# Patient Record
Sex: MALE | Race: WHITE | NOT HISPANIC OR LATINO | Employment: OTHER | URBAN - METROPOLITAN AREA
[De-identification: names, ages, dates, MRNs, and addresses within clinical notes are randomized per-mention and may not be internally consistent; named-entity substitution may affect disease eponyms.]

---

## 2018-06-07 ENCOUNTER — ANESTHESIA EVENT (OUTPATIENT)
Dept: OPERATING ROOM | Facility: HOSPITAL | Age: 73
Setting detail: HOSPITAL OUTPATIENT SURGERY
DRG: 708 | End: 2018-06-07
Payer: COMMERCIAL

## 2018-06-08 ENCOUNTER — HOSPITAL ENCOUNTER (INPATIENT)
Facility: HOSPITAL | Age: 73
LOS: 1 days | Discharge: HOME | DRG: 708 | End: 2018-06-09
Attending: UROLOGY | Admitting: UROLOGY
Payer: COMMERCIAL

## 2018-06-08 ENCOUNTER — ANESTHESIA (OUTPATIENT)
Dept: OPERATING ROOM | Facility: HOSPITAL | Age: 73
Setting detail: HOSPITAL OUTPATIENT SURGERY
DRG: 708 | End: 2018-06-08
Payer: COMMERCIAL

## 2018-06-08 DIAGNOSIS — C61 PROSTATE CA (CMS/HCC): ICD-10-CM

## 2018-06-08 LAB
ABO + RH BLD: NORMAL
ABO + RH BLD: NORMAL
BLD GP AB SCN SERPL QL: NEGATIVE
D AG BLD QL: POSITIVE
D AG BLD QL: POSITIVE
LABORATORY COMMENT REPORT: NORMAL
LABORATORY COMMENT REPORT: NORMAL

## 2018-06-08 PROCEDURE — 63600000 HC DRUGS/DETAIL CODE: Performed by: NURSE ANESTHETIST, CERTIFIED REGISTERED

## 2018-06-08 PROCEDURE — 25800000 HC PHARMACY IV SOLUTIONS: Performed by: UROLOGY

## 2018-06-08 PROCEDURE — 63600000 HC DRUGS/DETAIL CODE: Performed by: UROLOGY

## 2018-06-08 PROCEDURE — 27200000 HC STERILE SUPPLY: Performed by: UROLOGY

## 2018-06-08 PROCEDURE — 71000011 HC PACU PHASE 1 EA ADDL MIN: Performed by: UROLOGY

## 2018-06-08 PROCEDURE — 36000016 HC OR LEVEL 6 EA ADDL MIN: Performed by: UROLOGY

## 2018-06-08 PROCEDURE — 86850 RBC ANTIBODY SCREEN: CPT

## 2018-06-08 PROCEDURE — 20600000 HC ROOM AND CARE INTERMEDIATE/TELEMETRY

## 2018-06-08 PROCEDURE — 71000001 HC PACU PHASE 1 INITIAL 30MIN: Performed by: UROLOGY

## 2018-06-08 PROCEDURE — 0VT34ZZ RESECTION OF BILATERAL SEMINAL VESICLES, PERCUTANEOUS ENDOSCOPIC APPROACH: ICD-10-PCS | Performed by: UROLOGY

## 2018-06-08 PROCEDURE — 63700000 HC SELF-ADMINISTRABLE DRUG: Performed by: UROLOGY

## 2018-06-08 PROCEDURE — 36415 COLL VENOUS BLD VENIPUNCTURE: CPT | Performed by: UROLOGY

## 2018-06-08 PROCEDURE — 25800000 HC PHARMACY IV SOLUTIONS: Performed by: NURSE ANESTHETIST, CERTIFIED REGISTERED

## 2018-06-08 PROCEDURE — 25000000 HC PHARMACY GENERAL: Performed by: NURSE ANESTHETIST, CERTIFIED REGISTERED

## 2018-06-08 PROCEDURE — 25000000 HC PHARMACY GENERAL: Performed by: UROLOGY

## 2018-06-08 PROCEDURE — 63600000 HC DRUGS/DETAIL CODE: Performed by: ANESTHESIOLOGY

## 2018-06-08 PROCEDURE — 36000006 HC OR LEVEL 6 INITIAL 30MIN: Performed by: UROLOGY

## 2018-06-08 PROCEDURE — 0VTQ4ZZ RESECTION OF BILATERAL VAS DEFERENS, PERCUTANEOUS ENDOSCOPIC APPROACH: ICD-10-PCS | Performed by: UROLOGY

## 2018-06-08 PROCEDURE — 88304 TISSUE EXAM BY PATHOLOGIST: CPT | Performed by: UROLOGY

## 2018-06-08 PROCEDURE — 0VT04ZZ RESECTION OF PROSTATE, PERCUTANEOUS ENDOSCOPIC APPROACH: ICD-10-PCS | Performed by: UROLOGY

## 2018-06-08 PROCEDURE — 07TC4ZZ RESECTION OF PELVIS LYMPHATIC, PERCUTANEOUS ENDOSCOPIC APPROACH: ICD-10-PCS | Performed by: UROLOGY

## 2018-06-08 PROCEDURE — 37000001 HC ANESTHESIA GENERAL: Performed by: UROLOGY

## 2018-06-08 RX ORDER — IBUPROFEN 200 MG
16-32 TABLET ORAL AS NEEDED
Status: DISCONTINUED | OUTPATIENT
Start: 2018-06-08 | End: 2018-06-08 | Stop reason: HOSPADM

## 2018-06-08 RX ORDER — DEXTROSE 40 %
15-30 GEL (GRAM) ORAL AS NEEDED
Status: DISCONTINUED | OUTPATIENT
Start: 2018-06-08 | End: 2018-06-08 | Stop reason: HOSPADM

## 2018-06-08 RX ORDER — PROMETHAZINE HYDROCHLORIDE 25 MG/ML
6.25 INJECTION, SOLUTION INTRAMUSCULAR; INTRAVENOUS ONCE
Status: COMPLETED | OUTPATIENT
Start: 2018-06-08 | End: 2018-06-08

## 2018-06-08 RX ORDER — EPHEDRINE SULFATE 50 MG/ML
INJECTION, SOLUTION INTRAVENOUS AS NEEDED
Status: DISCONTINUED | OUTPATIENT
Start: 2018-06-08 | End: 2018-06-08 | Stop reason: SURG

## 2018-06-08 RX ORDER — SODIUM CHLORIDE, SODIUM GLUCONATE, SODIUM ACETATE, POTASSIUM CHLORIDE AND MAGNESIUM CHLORIDE 30; 37; 368; 526; 502 MG/100ML; MG/100ML; MG/100ML; MG/100ML; MG/100ML
INJECTION, SOLUTION INTRAVENOUS CONTINUOUS PRN
Status: DISCONTINUED | OUTPATIENT
Start: 2018-06-08 | End: 2018-06-08 | Stop reason: SURG

## 2018-06-08 RX ORDER — DEXAMETHASONE SODIUM PHOSPHATE 4 MG/ML
INJECTION, SOLUTION INTRA-ARTICULAR; INTRALESIONAL; INTRAMUSCULAR; INTRAVENOUS; SOFT TISSUE AS NEEDED
Status: DISCONTINUED | OUTPATIENT
Start: 2018-06-08 | End: 2018-06-08 | Stop reason: SURG

## 2018-06-08 RX ORDER — DEXTROSE 40 %
15-30 GEL (GRAM) ORAL AS NEEDED
Status: DISCONTINUED | OUTPATIENT
Start: 2018-06-08 | End: 2018-06-09 | Stop reason: HOSPADM

## 2018-06-08 RX ORDER — KETOROLAC TROMETHAMINE 30 MG/ML
INJECTION, SOLUTION INTRAMUSCULAR; INTRAVENOUS AS NEEDED
Status: DISCONTINUED | OUTPATIENT
Start: 2018-06-08 | End: 2018-06-08 | Stop reason: SURG

## 2018-06-08 RX ORDER — OXYCODONE AND ACETAMINOPHEN 5; 325 MG/1; MG/1
1-2 TABLET ORAL EVERY 4 HOURS PRN
Status: DISCONTINUED | OUTPATIENT
Start: 2018-06-08 | End: 2018-06-09 | Stop reason: HOSPADM

## 2018-06-08 RX ORDER — SODIUM CHLORIDE 9 MG/ML
INJECTION, SOLUTION INTRAVENOUS CONTINUOUS
Status: DISCONTINUED | OUTPATIENT
Start: 2018-06-08 | End: 2018-06-09 | Stop reason: HOSPADM

## 2018-06-08 RX ORDER — POLYETHYLENE GLYCOL 3350 17 G/17G
17 POWDER, FOR SOLUTION ORAL DAILY
Status: DISCONTINUED | OUTPATIENT
Start: 2018-06-08 | End: 2018-06-09 | Stop reason: HOSPADM

## 2018-06-08 RX ORDER — POTASSIUM CHLORIDE 750 MG/1
10 TABLET, FILM COATED, EXTENDED RELEASE ORAL DAILY
Status: DISCONTINUED | OUTPATIENT
Start: 2018-06-08 | End: 2018-06-09 | Stop reason: HOSPADM

## 2018-06-08 RX ORDER — METOCLOPRAMIDE HYDROCHLORIDE 5 MG/ML
10 INJECTION INTRAMUSCULAR; INTRAVENOUS EVERY 6 HOURS PRN
Status: DISCONTINUED | OUTPATIENT
Start: 2018-06-08 | End: 2018-06-09 | Stop reason: HOSPADM

## 2018-06-08 RX ORDER — MIDAZOLAM HYDROCHLORIDE 2 MG/2ML
INJECTION, SOLUTION INTRAMUSCULAR; INTRAVENOUS AS NEEDED
Status: DISCONTINUED | OUTPATIENT
Start: 2018-06-08 | End: 2018-06-08 | Stop reason: SURG

## 2018-06-08 RX ORDER — KETOROLAC TROMETHAMINE 15 MG/ML
15 INJECTION, SOLUTION INTRAMUSCULAR; INTRAVENOUS EVERY 6 HOURS
Status: DISCONTINUED | OUTPATIENT
Start: 2018-06-08 | End: 2018-06-09 | Stop reason: HOSPADM

## 2018-06-08 RX ORDER — FENTANYL CITRATE 50 UG/ML
INJECTION, SOLUTION INTRAMUSCULAR; INTRAVENOUS AS NEEDED
Status: DISCONTINUED | OUTPATIENT
Start: 2018-06-08 | End: 2018-06-08 | Stop reason: SURG

## 2018-06-08 RX ORDER — LIDOCAINE HYDROCHLORIDE 10 MG/ML
INJECTION, SOLUTION INFILTRATION; PERINEURAL AS NEEDED
Status: DISCONTINUED | OUTPATIENT
Start: 2018-06-08 | End: 2018-06-08 | Stop reason: SURG

## 2018-06-08 RX ORDER — ONDANSETRON HYDROCHLORIDE 2 MG/ML
4 INJECTION, SOLUTION INTRAVENOUS
Status: DISCONTINUED | OUTPATIENT
Start: 2018-06-08 | End: 2018-06-08 | Stop reason: HOSPADM

## 2018-06-08 RX ORDER — PROPOFOL 10 MG/ML
INJECTION, EMULSION INTRAVENOUS AS NEEDED
Status: DISCONTINUED | OUTPATIENT
Start: 2018-06-08 | End: 2018-06-08 | Stop reason: SURG

## 2018-06-08 RX ORDER — ALUMINUM HYDROXIDE, MAGNESIUM HYDROXIDE, AND SIMETHICONE 1200; 120; 1200 MG/30ML; MG/30ML; MG/30ML
30 SUSPENSION ORAL EVERY 4 HOURS PRN
Status: DISCONTINUED | OUTPATIENT
Start: 2018-06-08 | End: 2018-06-09 | Stop reason: HOSPADM

## 2018-06-08 RX ORDER — FENTANYL CITRATE 50 UG/ML
50 INJECTION, SOLUTION INTRAMUSCULAR; INTRAVENOUS
Status: DISCONTINUED | OUTPATIENT
Start: 2018-06-08 | End: 2018-06-08

## 2018-06-08 RX ORDER — EPHEDRINE SULFATE 50 MG/ML
INJECTION, SOLUTION INTRAVENOUS AS NEEDED
Status: DISCONTINUED | OUTPATIENT
Start: 2018-06-08 | End: 2018-06-08

## 2018-06-08 RX ORDER — HYDROMORPHONE HYDROCHLORIDE 1 MG/ML
0.5 INJECTION, SOLUTION INTRAMUSCULAR; INTRAVENOUS; SUBCUTANEOUS
Status: DISCONTINUED | OUTPATIENT
Start: 2018-06-08 | End: 2018-06-08

## 2018-06-08 RX ORDER — POLYETHYLENE GLYCOL 3350 17 G/17G
POWDER, FOR SOLUTION ORAL
Status: DISPENSED
Start: 2018-06-08 | End: 2018-06-09

## 2018-06-08 RX ORDER — BUPIVACAINE HYDROCHLORIDE 5 MG/ML
INJECTION, SOLUTION EPIDURAL; INTRACAUDAL AS NEEDED
Status: DISCONTINUED | OUTPATIENT
Start: 2018-06-08 | End: 2018-06-08 | Stop reason: HOSPADM

## 2018-06-08 RX ORDER — PROMETHAZINE HYDROCHLORIDE 25 MG/ML
6.25 INJECTION, SOLUTION INTRAMUSCULAR; INTRAVENOUS AS NEEDED
Status: DISCONTINUED | OUTPATIENT
Start: 2018-06-08 | End: 2018-06-08 | Stop reason: HOSPADM

## 2018-06-08 RX ORDER — DEXTROSE 50 % IN WATER (D50W) INTRAVENOUS SYRINGE
25 AS NEEDED
Status: DISCONTINUED | OUTPATIENT
Start: 2018-06-08 | End: 2018-06-08 | Stop reason: HOSPADM

## 2018-06-08 RX ORDER — IBUPROFEN 200 MG
16-32 TABLET ORAL AS NEEDED
Status: DISCONTINUED | OUTPATIENT
Start: 2018-06-08 | End: 2018-06-09 | Stop reason: HOSPADM

## 2018-06-08 RX ORDER — ROCURONIUM BROMIDE 10 MG/ML
INJECTION, SOLUTION INTRAVENOUS AS NEEDED
Status: DISCONTINUED | OUTPATIENT
Start: 2018-06-08 | End: 2018-06-08 | Stop reason: SURG

## 2018-06-08 RX ORDER — EZETIMIBE 10 MG/1
10 TABLET ORAL
Status: DISCONTINUED | OUTPATIENT
Start: 2018-06-09 | End: 2018-06-09 | Stop reason: HOSPADM

## 2018-06-08 RX ORDER — HYDROMORPHONE HYDROCHLORIDE 1 MG/ML
INJECTION, SOLUTION INTRAMUSCULAR; INTRAVENOUS; SUBCUTANEOUS AS NEEDED
Status: DISCONTINUED | OUTPATIENT
Start: 2018-06-08 | End: 2018-06-08 | Stop reason: SURG

## 2018-06-08 RX ORDER — ONDANSETRON HYDROCHLORIDE 2 MG/ML
INJECTION, SOLUTION INTRAVENOUS AS NEEDED
Status: DISCONTINUED | OUTPATIENT
Start: 2018-06-08 | End: 2018-06-08 | Stop reason: SURG

## 2018-06-08 RX ORDER — LIDOCAINE HYDROCHLORIDE 20 MG/ML
INJECTION, SOLUTION INFILTRATION; PERINEURAL AS NEEDED
Status: DISCONTINUED | OUTPATIENT
Start: 2018-06-08 | End: 2018-06-08 | Stop reason: HOSPADM

## 2018-06-08 RX ORDER — DEXTROSE 50 % IN WATER (D50W) INTRAVENOUS SYRINGE
25 AS NEEDED
Status: DISCONTINUED | OUTPATIENT
Start: 2018-06-08 | End: 2018-06-09 | Stop reason: HOSPADM

## 2018-06-08 RX ORDER — SODIUM CHLORIDE 9 MG/ML
20 INJECTION, SOLUTION INTRAVENOUS CONTINUOUS
Status: ACTIVE | OUTPATIENT
Start: 2018-06-08 | End: 2018-06-09

## 2018-06-08 RX ORDER — FUROSEMIDE 20 MG/1
20 TABLET ORAL DAILY
Status: DISCONTINUED | OUTPATIENT
Start: 2018-06-08 | End: 2018-06-09 | Stop reason: HOSPADM

## 2018-06-08 RX ORDER — HYDROMORPHONE HYDROCHLORIDE 1 MG/ML
.25-.5 INJECTION, SOLUTION INTRAMUSCULAR; INTRAVENOUS; SUBCUTANEOUS
Status: DISCONTINUED | OUTPATIENT
Start: 2018-06-08 | End: 2018-06-09 | Stop reason: HOSPADM

## 2018-06-08 RX ORDER — METOPROLOL TARTRATE 25 MG/1
25 TABLET, FILM COATED ORAL 2 TIMES DAILY
Status: DISCONTINUED | OUTPATIENT
Start: 2018-06-08 | End: 2018-06-09 | Stop reason: HOSPADM

## 2018-06-08 RX ORDER — METOCLOPRAMIDE 5 MG/1
10 TABLET ORAL EVERY 6 HOURS PRN
Status: DISCONTINUED | OUTPATIENT
Start: 2018-06-08 | End: 2018-06-09 | Stop reason: HOSPADM

## 2018-06-08 RX ORDER — ROSUVASTATIN CALCIUM 5 MG/1
5 TABLET, COATED ORAL
Status: DISCONTINUED | OUTPATIENT
Start: 2018-06-09 | End: 2018-06-09 | Stop reason: HOSPADM

## 2018-06-08 RX ADMIN — SODIUM CHLORIDE: 9 INJECTION, SOLUTION INTRAVENOUS at 14:20

## 2018-06-08 RX ADMIN — PROPOFOL 200 MG: 10 INJECTION, EMULSION INTRAVENOUS at 07:37

## 2018-06-08 RX ADMIN — POLYETHYLENE GLYCOL 3350 17 G: 17 POWDER, FOR SOLUTION ORAL at 17:01

## 2018-06-08 RX ADMIN — HYDROMORPHONE HYDROCHLORIDE 0.5 MG: 1 INJECTION, SOLUTION INTRAMUSCULAR; INTRAVENOUS; SUBCUTANEOUS at 08:14

## 2018-06-08 RX ADMIN — EPHEDRINE SULFATE 10 MG: 50 INJECTION INTRAVENOUS at 07:48

## 2018-06-08 RX ADMIN — HYDROMORPHONE HYDROCHLORIDE 0.5 MG: 1 INJECTION, SOLUTION INTRAMUSCULAR; INTRAVENOUS; SUBCUTANEOUS at 10:10

## 2018-06-08 RX ADMIN — SUGAMMADEX 200 MG: 100 INJECTION, SOLUTION INTRAVENOUS at 11:30

## 2018-06-08 RX ADMIN — FENTANYL CITRATE 100 MCG: 50 INJECTION INTRAMUSCULAR; INTRAVENOUS at 07:37

## 2018-06-08 RX ADMIN — ROCURONIUM BROMIDE 10 MG: 10 INJECTION INTRAVENOUS at 09:20

## 2018-06-08 RX ADMIN — EPHEDRINE SULFATE 10 MG: 50 INJECTION INTRAVENOUS at 08:02

## 2018-06-08 RX ADMIN — DEXAMETHASONE SODIUM PHOSPHATE 4 MG: 4 INJECTION, SOLUTION INTRAMUSCULAR; INTRAVENOUS at 07:43

## 2018-06-08 RX ADMIN — ROCURONIUM BROMIDE 20 MG: 10 INJECTION INTRAVENOUS at 08:40

## 2018-06-08 RX ADMIN — HYDROMORPHONE HYDROCHLORIDE 0.5 MG: 1 INJECTION, SOLUTION INTRAMUSCULAR; INTRAVENOUS; SUBCUTANEOUS at 11:00

## 2018-06-08 RX ADMIN — ROCURONIUM BROMIDE 10 MG: 10 INJECTION INTRAVENOUS at 11:00

## 2018-06-08 RX ADMIN — KETOROLAC TROMETHAMINE 15 MG: 15 INJECTION, SOLUTION INTRAMUSCULAR; INTRAVENOUS at 17:01

## 2018-06-08 RX ADMIN — MIDAZOLAM HYDROCHLORIDE 2 MG: 1 INJECTION, SOLUTION INTRAMUSCULAR; INTRAVENOUS at 07:27

## 2018-06-08 RX ADMIN — SODIUM CHLORIDE: 9 INJECTION, SOLUTION INTRAVENOUS at 22:47

## 2018-06-08 RX ADMIN — ROCURONIUM BROMIDE 50 MG: 10 INJECTION INTRAVENOUS at 07:37

## 2018-06-08 RX ADMIN — METOPROLOL TARTRATE 25 MG: 25 TABLET ORAL at 20:44

## 2018-06-08 RX ADMIN — ONDANSETRON 4 MG: 2 INJECTION INTRAMUSCULAR; INTRAVENOUS at 11:07

## 2018-06-08 RX ADMIN — KETOROLAC TROMETHAMINE 15 MG: 30 INJECTION, SOLUTION INTRAMUSCULAR at 11:07

## 2018-06-08 RX ADMIN — HYDROMORPHONE HYDROCHLORIDE 0.5 MG: 1 INJECTION, SOLUTION INTRAMUSCULAR; INTRAVENOUS; SUBCUTANEOUS at 09:09

## 2018-06-08 RX ADMIN — PROMETHAZINE HYDROCHLORIDE 6.25 MG: 25 INJECTION INTRAMUSCULAR; INTRAVENOUS at 13:33

## 2018-06-08 RX ADMIN — CEFTRIAXONE SODIUM 2 G: 2 INJECTION, POWDER, FOR SOLUTION INTRAVENOUS at 07:30

## 2018-06-08 RX ADMIN — ONDANSETRON 4 MG: 2 INJECTION INTRAMUSCULAR; INTRAVENOUS at 07:43

## 2018-06-08 RX ADMIN — ROCURONIUM BROMIDE 30 MG: 10 INJECTION INTRAVENOUS at 08:13

## 2018-06-08 RX ADMIN — LIDOCAINE HYDROCHLORIDE 5 ML: 10 INJECTION, SOLUTION INFILTRATION; PERINEURAL at 07:37

## 2018-06-08 RX ADMIN — SODIUM CHLORIDE, SODIUM GLUCONATE, SODIUM ACETATE, POTASSIUM CHLORIDE AND MAGNESIUM CHLORIDE: 526; 502; 368; 37; 30 INJECTION, SOLUTION INTRAVENOUS at 07:42

## 2018-06-08 RX ADMIN — SODIUM CHLORIDE 20 ML/HR: 9 INJECTION, SOLUTION INTRAVENOUS at 06:23

## 2018-06-08 ASSESSMENT — COGNITIVE AND FUNCTIONAL STATUS - GENERAL
DRESSING REGULAR UPPER BODY CLOTHING: 4 - NONE
CLIMB 3 TO 5 STEPS WITH RAILING: 4 - NONE
EATING MEALS: 4 - NONE
HELP NEEDED FOR BATHING: 4 - NONE
WALKING IN HOSPITAL ROOM: 4 - NONE
STANDING UP FROM CHAIR USING ARMS: 4 - NONE
TOILETING: 4 - NONE
DRESSING REGULAR LOWER BODY CLOTHING: 4 - NONE
MOVING TO AND FROM BED TO CHAIR: 4 - NONE
HELP NEEDED FOR PERSONAL GROOMING: 4 - NONE

## 2018-06-08 ASSESSMENT — COPD QUESTIONNAIRES: COPD: 1

## 2018-06-08 ASSESSMENT — PAIN - FUNCTIONAL ASSESSMENT
PAIN_FUNCTIONAL_ASSESSMENT: NO/DENIES PAIN
PAIN_FUNCTIONAL_ASSESSMENT: 0-10

## 2018-06-08 ASSESSMENT — PAIN SCALES - GENERAL: PAIN_LEVEL: 1

## 2018-06-08 NOTE — ANESTHESIA POSTPROCEDURE EVALUATION
Patient: Guilherme Son    Procedure Summary     Date:  06/08/18 Room / Location:   OR 11 /  OR    Anesthesia Start:  0730 Anesthesia Stop:  1148    Procedure:  Laparoscopic Radical Robotic Prostatectomy, Bilateral PLND, Inguinal Herniorrhaphy (Bilateral ) Diagnosis:  (Prostate Cancer)    Surgeon:  Cisco Hough MD Responsible Provider:  Luzma Saldana MD    Anesthesia Type:  general ASA Status:  3          Anesthesia Type: general  PACU Vitals  6/8/2018 1139 - 6/8/2018 1239      6/8/2018 1155 6/8/2018 1200 6/8/2018 1215 6/8/2018 1230    BP: (!)  146/71 129/68 126/65 136/70    Temp: 36.4 °C (97.6 °F) - - -    Pulse: 66 (!)  58 60 (!)  58    Resp: 17 14 12 12    SpO2: 93 % 94 % 95 % 97 %            Anesthesia Post Evaluation    Pain score: 1  Pain management: adequate  Mode of pain management: IV medication  Patient location during evaluation: PACU  Patient participation: complete - patient participated  Level of consciousness: awake and alert  Cardiovascular status: acceptable  Airway Patency: adequate  Respiratory status: acceptable  Hydration status: acceptable  Anesthetic complications: no

## 2018-06-08 NOTE — PERIOPERATIVE NURSING NOTE
Dr. Beckham updated no improvement in pt nausea. Phenergan given. Patients's daughter updated. Pt did not feel up to seeing her now.

## 2018-06-08 NOTE — PERIOPERATIVE NURSING NOTE
Awaiting room readiness. Daughter remains at bedside. LEYDA protocol reviewed.  Surgical sites well approximated. Dwyer patent rust colored urine.  Deep breathing and coughing done well. IS done to 3200. Splinting reviewed

## 2018-06-08 NOTE — PERIOPERATIVE NURSING NOTE
Pt complained of slight nausea. Comfort measures. Ice pack, aromatherapy. Not effective. Anesthesia notified

## 2018-06-08 NOTE — ANESTHESIA PROCEDURE NOTES
Airway  Urgency: elective    Start Time: 6/8/2018 7:40 AM  Airway not difficult    General Information and Staff    Patient location during procedure: OR  Anesthesiologist: STEPHANI HARRIS  Resident/CRNA: MARCOS JALLOH  Performed: resident/CRNA     Indications and Patient Condition  Indications for airway management: anesthesia and airway protection  Sedation level: deep  Preoxygenated: yes  Patient position: sniffing  MILS maintained throughout  Mask difficulty assessment: 1 - vent by mask    Final Airway Details  Final airway type: endotracheal airway      Successful airway: ETT  Cuffed: yes   Successful intubation technique: video laryngoscopy  Endotracheal tube insertion site: oral  Blade: Anastasia  Blade size: #3  ETT size: 8.0 mm  Cormack-Lehane Classification: grade IIa - partial view of glottis  Placement verified by: chest auscultation and capnometry   Measured from: lips  ETT to lips (cm): 23  Number of attempts at approach: 1  Number of other approaches attempted: 0  Atraumatic airway insertion

## 2018-06-08 NOTE — ANESTHESIA PREPROCEDURE EVALUATION
Anesthesia ROS/MED HX      Pulmonary    COPD  Cardiovascular   hypertension  Endo/Other   Chronic renal disease      Past Surgical History:   Procedure Laterality Date   • CATARACT EXTRACTION W/ INTRAOCULAR LENS  IMPLANT, BILATERAL     • COLONOSCOPY     • KNEE ARTHROSCOPY Right    • ORIF RADIUS & ULNA FRACTURES Right 2002   • THUMB FUSION Right 1976       Physical Exam    Airway   Mallampati: IV   TM distance: >3 FB   Neck ROM: full  Cardiovascular - normal   Rhythm: regular   Rate: normal  Pulmonary - normal   clear to auscultation  Dental - normal        Anesthesia Plan    Plan: general    Technique: general endotracheal     Airway: video laryngoscope   ASA 3

## 2018-06-08 NOTE — OP NOTE
OPERATIVE PROCEDURE NOTE    PATIENT NAME: Guilherme Son   MRN: [id number]  : 1945  DATE OF PROCEDURE: 2018      PRE-OPERATIVE DIAGNOSIS:   Prostate Cancer      POST-OPERATIVE DIAGNOSIS:   Same      PROCEDURE:   Robotic Assisted Radical Prostatectomy  Robotic Assisted Bilateral Pelvic Lymph Node Dissection    SURGEON:   Cisco Hough MD      ANESTHESIA:   General endotracheal anesthesia      INDICATIONS:  The patient presented for urological evaluation for a prostate cancer.  The various treatment options were discussed along with the risks and complications of each alternative.  Ultimately, robotic assisted radical prostatectomy was agreed upon to be the best option.  The procedure itself, benefits, and risks were reviewed in great detail with the patient and available family members.  Specifically, the most common risks were discussed which included included bleeding (both acute bleeding as well as bleeding in a delayed manner, may require blood transfusion, reoperation), infection, scarring, injury to the ureters, need for additional surgery, failure to remove all of the tumor, prolonged hospital stay, and death.  We also discussed the possibility of bladder leak at the anastomosis requiring prolonged catheterization.  The patient agreed with the plan and informed consent was obtained.      PROCEDURE IN DETAIL:  After obtaining informed consent and fully understanding the risks and benefits of the procedure the patient was transported from the preoperative holding area to the surgical suite.  Once in the surgical suite the patient was placed in the supine position and placed under anesthesia by the anesthesia department.  Sequential compression devices were confirmed to be on the lower extremities, positioned appropriately, and functioning normally.  The patient was then repositioned into a modified dorsal lithotomy position.  The patient was secured to the table and positional testing was  performed.  The patient was then prepped and draped sterilely in the usual manner.  An appropriate timeout using 2 unique patient identifiers was then performed with all members of the surgical staff present.      Veress needle access was obtained in the midline superior to the umbilicus.  The abdomen was insufflated to 15 mmHg.  A blunt tipped trocar was placed into the camera port position.  The camera was then advanced into the abdomen, and the underlying abdominal contents including solid organs, bowel, and vasculature were all carefully inspected to ensure no injury during the process of achieving access.  All other trochars were placed under direct visualization.  A standard robotic assisted radical prostatectomy template was utilized.  The robot was docked in standard fashion.    Dissection began by incising the anterior aspect of the posterior cul-de-sac exposing the right and left vas deferens.  The right vas deferens was grasped, and then transected using electrocautery.  The dissection continued distally until the right seminal vesicle was encountered.  Using a combination of blunt and electrocautery dissection, the seminal vesicle was dissected from the surrounding tissue.  Once the right side was completely exposed, attention was turned to the left vas deferens which in similar manner was transected, and then dissected free from surrounding tissue.  This exposed the left seminal vesicle which was dissected from the surrounding tissues and a combination of blunt dissection and electrocautery.    The vas and seminal deferens were then placed on anterior retraction exposing Denonvillier's fascia.  The fascia was entered sharply.  Dissection then continued distally between the rectum and the prostate completely exposing the posterior space.  Reinspection of the underlying rectum identified no injury.    Attention was then turned to the obliterated medial umbilical ligaments.  The anterior peritoneum was  incised lateral to the ligaments to a point where the the ligaments intersected with the proximal vas deferens bilaterally.  This allowed entry into the space of Retzius which was developed distally to a point beyond the suprapubic bone.  The obliterated ligaments were then transected using generous electrocautery to the level of the umbilicus allowing the bladder to be completely mobilized.      The fatty tissue overlying the prostate and endopelvic fascia was then carefully dissected.  This was sent off as a specimen labeled preprostatic fat.  Attention was then turned to the right pelvic lymph nodes.  The lymph node packet was developed from the external iliac vein to sidewall, distally to the inguinal ligament, proximally to the bifurcation of the common iliac vein, posteriorly to the obturator nerve, and medially to the level of the bladder.  Once lymph node packet was developed and the operative nerve identified, clips were used at the level of the inguinal ligament to allow for safe transection of the lymph node packet.  Lymph node packet was sent off as right pelvic lymph node packet.  On the left-hand side, using the exact landmarks, the left lymph node packet was similarly developed, clipped distally, transected, and sent as left pelvic lymph node packet.    Attention was then turned to the endopelvic fascia which was entered sharply.  This exposed the prostatic capsule.  The levator muscles were then sharply dissected off of the prostate circumferentially.  With the aid of the Dwyer catheter balloon, the bladder neck was identified in the anterior bladder neck was developed by incising in the plane between the prostate and the bladder neck.  This was carried down into the bladder was entered and the catheter could be delivered through the defect.  The prostate was then placed on anterior stretch.    The posterior aspect of the bladder was inspected.  The posterior bladder neck was developed until the  seminal vesicles and vas deferens were identified.  These were delivered through the defect in the posterior bladder neck and placed on anterior traction.  The pedicles were identified bilaterally, and using minimal cautery, were clipped using  clips before being sharply transected.  This dissection was carried bilateral through the pedicles of the prostate until the neurovascular bundle was identified.  The neurovascular bundle was carefully dissected off of the inferolateral aspect of the prostate to a point distal to the apex of the prostate.  The catheter in place, the urethra was sharply transected completely freeing the prostate.  The prostate was then manipulated into an Endo Catch bag and set aside.    The rectum was irrigated and systematically inspected confirming that there is no injury to the rectum.  The neurovascular bundle was reinspected confirming that there is no bleeding from the neurovascular bundle.  The bladder and bladder neck was then inspected.    Double-armed 3-0 V-Loc sutures were then used to reapproximate the bladder to the urethra in a standard running manner.  Once reapproximated, a fresh indwelling Dwyer catheter was passed into the bladder.  The anastomosis was tested with over 200 cc of sterile water.  No anastomotic leak was identified.      The entire surgical field was then systematically re-inspected and it was confirmed that hemostasis had been achieved and that there was no injury to any abdominal contents.  The robot was undocked in standard fashion.  The assistant port site was then extended and the mass was removed.  The fascia was then re-approximated using 0-Vicryl.  Carmella's fascia was re-approximated using 2-0 Vicryl.  Skin was then reapproximated using 4-0 Monocryl.  The abdomen was then reinsufflated and inspection of the fascial closure as well as the operative field confirm that there was no bowel entrapment, hemostasis had been achieved, and that abdominal  contents had not been injured during the course of surgery.  Lap, needle, and instrument counts were confirmed to be correct.    The patient was then repositioned into the supine position awakened in stable condition and transported to the postoperative recovery area.      CONDITION:  Stable      ESTIMATED BLOOD LOSS:  150 mL      COMPLICATIONS:  None      SPECIMENS:  ID Type Source Tests Collected by Time Destination   1 : Pre prostatic fat Tissue Prostate PATHOLOGY TISSUE EXAM Cisco Hough MD 6/8/2018 0915    2 : Right pelvic lymph node packet Tissue Prostate PATHOLOGY TISSUE EXAM Cisco Hough MD 6/8/2018 0918    3 : Left pelvic lymph node packet Tissue Prostate PATHOLOGY TISSUE EXAM Cisco Hough MD 6/8/2018 0930    4 : Prostate and bilateral seminal vesicles Tissue Prostate PATHOLOGY TISSUE EXAM Cisco Hough MD 6/8/2018 1106          DRAINS:  None      IMPLANTS:  * No implants in log *        Cisco Hough MD  Phone Number: [phone]  6/8/2018  12:24 PM

## 2018-06-08 NOTE — PERIOPERATIVE NURSING NOTE
Pt denies pain. Continues to c/o nausea but is able to doze off.  While asleep periods of apnea noted.  Awakens to alarm and takes deep breathes.

## 2018-06-09 VITALS
BODY MASS INDEX: 39.14 KG/M2 | SYSTOLIC BLOOD PRESSURE: 121 MMHG | DIASTOLIC BLOOD PRESSURE: 58 MMHG | TEMPERATURE: 98.6 F | WEIGHT: 305 LBS | RESPIRATION RATE: 18 BRPM | HEIGHT: 74 IN | OXYGEN SATURATION: 98 % | HEART RATE: 63 BPM

## 2018-06-09 LAB
ANION GAP SERPL CALC-SCNC: 7 MEQ/L (ref 3–15)
BUN SERPL-MCNC: 16 MG/DL (ref 8–20)
CALCIUM SERPL-MCNC: 8.6 MG/DL (ref 8.9–10.3)
CHLORIDE SERPL-SCNC: 107 MMOL/L (ref 98–109)
CO2 SERPL-SCNC: 25 MMOL/L (ref 22–32)
CREAT SERPL-MCNC: 0.9 MG/DL (ref 0.8–1.3)
GFR SERPL CREATININE-BSD FRML MDRD: >60 ML/MIN/1.73M*2
GLUCOSE SERPL-MCNC: 136 MG/DL (ref 70–99)
HCT VFR BLDCO AUTO: 37.6 % (ref 40–51)
HGB BLD-MCNC: 13.1 G/DL (ref 13.7–17.5)
POTASSIUM SERPL-SCNC: 3.9 MMOL/L (ref 3.6–5.1)
SODIUM SERPL-SCNC: 139 MMOL/L (ref 136–144)

## 2018-06-09 PROCEDURE — 63600000 HC DRUGS/DETAIL CODE: Performed by: UROLOGY

## 2018-06-09 PROCEDURE — 80048 BASIC METABOLIC PNL TOTAL CA: CPT | Performed by: UROLOGY

## 2018-06-09 PROCEDURE — 25800000 HC PHARMACY IV SOLUTIONS: Performed by: UROLOGY

## 2018-06-09 PROCEDURE — 36415 COLL VENOUS BLD VENIPUNCTURE: CPT | Performed by: UROLOGY

## 2018-06-09 PROCEDURE — 63700000 HC SELF-ADMINISTRABLE DRUG: Performed by: UROLOGY

## 2018-06-09 PROCEDURE — 85014 HEMATOCRIT: CPT | Performed by: UROLOGY

## 2018-06-09 RX ADMIN — METOPROLOL TARTRATE 25 MG: 25 TABLET ORAL at 09:03

## 2018-06-09 RX ADMIN — SODIUM CHLORIDE: 9 INJECTION, SOLUTION INTRAVENOUS at 09:04

## 2018-06-09 RX ADMIN — CEFTRIAXONE SODIUM 1 G: 1 INJECTION, POWDER, FOR SOLUTION INTRAVENOUS at 11:13

## 2018-06-09 RX ADMIN — POTASSIUM CHLORIDE 10 MEQ: 750 TABLET, EXTENDED RELEASE ORAL at 09:02

## 2018-06-09 RX ADMIN — FUROSEMIDE 20 MG: 20 TABLET ORAL at 09:03

## 2018-06-09 RX ADMIN — KETOROLAC TROMETHAMINE 15 MG: 15 INJECTION, SOLUTION INTRAMUSCULAR; INTRAVENOUS at 06:26

## 2018-06-09 RX ADMIN — KETOROLAC TROMETHAMINE 15 MG: 15 INJECTION, SOLUTION INTRAMUSCULAR; INTRAVENOUS at 02:00

## 2018-06-09 NOTE — DISCHARGE INSTRUCTIONS
Laparoscopic Prostatectomy, Care After  This sheet gives you information about how to care for yourself after your procedure. Your health care provider may also give you more specific instructions. If you have problems or questions, contact your health care provider.  What can I expect after the procedure?  After the procedure, it is common to have:  · Pain.  · Abdominal discomfort.  · Nausea.  Follow these instructions at home:  Medicines  · Take over-the-counter and prescription medicines only as told by your health care provider.  · If you were prescribed an antibiotic medicine, take it as told by your health care provider. Do not stop taking the antibiotic even if you start to feel better.  Activity  · Increase your activity level slowly. Being active after your surgery is important because it reduces your risk of developing blood clots.  · Get out of bed as much as possible, but do not do activities that require a lot of energy. Walk around as tolerated.  · For the first 10 days after your procedure, avoid:  ¨ Lifting.  ¨ Straining.  ¨ Running.  ¨ Walking more than a couple of blocks.  ¨ Driving or riding in a car for long periods of time.  ¨ Sexual activity.  Diet  · Avoid alcohol and drinks with caffeine for 2 weeks. Alcohol and caffeine irritate the bladder.  · Avoid spicy foods. These can irritate the bladder.  · To prevent or treat constipation while you are taking prescription pain medicine, your health care provider may recommend that you:  ¨ Drink enough fluid to keep your urine clear or pale yellow.  ¨ Take over-the-counter or prescription medicines.  ¨ Eat foods that are high in fiber, such as fresh fruits and vegetables, whole grains, and beans.  ¨ Limit foods that are high in fat and processed sugars, such as fried and sweet foods.  Bowel and bladder care  · Follow your health care provider's instructions about caring for your Dwyer catheter.  · Urinate when you feel the need to. Do not hold in your  urine for long periods of time.  · Do not strain to have a bowel movement. Straining increases the chances of bleeding.  Incision care    · Follow instructions from your health care provider about how to take care of your incisions. Make sure you:  ¨ Wash your hands with soap and water before you change your bandage (dressing). If soap and water are not available, use hand .  ¨ Change your dressing as told by your health care provider.  ¨ Leave stitches (sutures) or adhesive strips in place. These skin closures may need to stay in place for 2 weeks or longer. If adhesive strip edges start to loosen and curl up, you may trim the loose edges. Do not remove adhesive strips completely unless your health care provider tells you to do that.  · Check your incision area every day for signs of infection. Check for:  ¨ Redness, swelling, or pain.  ¨ Fluid or blood discharge.  ¨ Warmth.  ¨ Pus or a bad smell.  General instructions  · Do coughing and deep breathing exercises as told by your health care provider. It may be helpful to take your pain medicines before doing these exercises.  · Do not use any products that contain nicotine or tobacco, such as cigarettes and e-cigarettes. If you need help quitting, ask your health care provider.  · Keep all follow-up visits as told by your health care provider. This is important.  Contact a health care provider if:  · You have redness, swelling, or pain around an incision.  · You have more fluid or blood coming from an incision.  · An incision feels warm to the touch.  · You have pus or a bad smell coming from an incision.  · You have a fever.  · An incision breaks open before or after sutures or staples have been removed.  Get help right away if:  · Your catheter stops draining urine.  · Your abdomen becomes swollen.  · You develop shortness of breath.  · You have a lot of bleeding from your incision.  · You have a high fever that does not go away.  · You develop pain in  your chest, back, or abdomen.  · You develop pain or swelling in your legs.  · You suddenly gain weight.  Summary  · After your procedure, it is common to have pain, abdominal discomfort, and nausea.  · Take over-the-counter and prescription medicines only as told by your health care provider. Finish all the prescribed antibiotic medicines, even if you start to feel better.  · Increase your activity level slowly to prevent blood clots.  · Follow your health care provider's instructions about how to take care of your incisions.  · Get help right away if you develop shortness of breath, or if you develop chest pain.  This information is not intended to replace advice given to you by your health care provider. Make sure you discuss any questions you have with your health care provider.  Document Released: 12/18/2006 Document Revised: 11/22/2017 Document Reviewed: 11/22/2017  [employer] Interactive Patient Education © 2017 [employer]

## 2018-06-09 NOTE — PROGRESS NOTES
Postop day #1     Vital signs stable, afebrile  Tolerating p.o.  H&H 13.1/37.6  Assessment: Stable  Plan: Discharged today.  Patient to be discharged with Dwyer catheter.  He will have leg bag and Dwyer bag.  Patient is to call Dr. Cheng's office for instructions on follow-up.

## 2018-06-09 NOTE — NURSING NOTE
Patient refusing further LEYDA protocol overnight. Patient educated on importance. Will continue to monitor Vs. Will continue to monitor.

## 2018-06-09 NOTE — NURSING NOTE
Sent text page to Dr. Zayas regarding patient's postop Rocephin being on MARhold. RN asked if physician could release the hold so the medication could be administered on time, waiting for call back.

## 2018-06-09 NOTE — PLAN OF CARE
Problem: Patient Care Overview  Goal: Plan of Care Review  Outcome: Ongoing (interventions implemented as appropriate)   06/09/18 0986   Coping/Psychosocial   Plan Of Care Reviewed With patient   Plan of Care Review   Progress progress toward functional goals as expected   Outcome Summary patient will report an improved pain level less than 5/10 during this shift

## 2018-06-09 NOTE — NURSING NOTE
Discharge instructions reviewed with patient. Patient verbalized understanding. Leg bag and florian bag provided to patient, leg bag education provided. Patient verbalized understanding of leg bag teaching.

## 2018-06-13 ENCOUNTER — APPOINTMENT (EMERGENCY)
Dept: CT IMAGING | Facility: HOSPITAL | Age: 73
End: 2018-06-13
Payer: COMMERCIAL

## 2018-06-13 ENCOUNTER — HOSPITAL ENCOUNTER (EMERGENCY)
Facility: HOSPITAL | Age: 73
Discharge: HOME | End: 2018-06-13
Attending: EMERGENCY MEDICINE | Admitting: EMERGENCY MEDICINE
Payer: COMMERCIAL

## 2018-06-13 VITALS
DIASTOLIC BLOOD PRESSURE: 75 MMHG | WEIGHT: 306.88 LBS | RESPIRATION RATE: 18 BRPM | HEART RATE: 56 BPM | OXYGEN SATURATION: 97 % | TEMPERATURE: 98.2 F | SYSTOLIC BLOOD PRESSURE: 157 MMHG

## 2018-06-13 DIAGNOSIS — N99.842 POSTOPERATIVE SEROMA INVOLVING GENITOURINARY SYSTEM AFTER GENITOURINARY PROCEDURE: Primary | ICD-10-CM

## 2018-06-13 DIAGNOSIS — R10.9 ABDOMINAL PAIN: ICD-10-CM

## 2018-06-13 DIAGNOSIS — Z90.79 STATUS POST PROSTATECTOMY: ICD-10-CM

## 2018-06-13 LAB
ALBUMIN SERPL BCP-MCNC: 3.1 G/DL (ref 3.5–5)
ALP SERPL-CCNC: 42 U/L (ref 46–116)
ALT SERPL W P-5'-P-CCNC: 28 U/L (ref 12–78)
ANION GAP SERPL CALCULATED.3IONS-SCNC: 9 MMOL/L (ref 4–13)
AST SERPL W P-5'-P-CCNC: 24 U/L (ref 5–45)
BACTERIA UR QL AUTO: ABNORMAL /HPF
BASOPHILS # BLD AUTO: 0.02 THOUSANDS/ΜL (ref 0–0.1)
BASOPHILS NFR BLD AUTO: 0 % (ref 0–1)
BILIRUB SERPL-MCNC: 0.7 MG/DL (ref 0.2–1)
BILIRUB UR QL STRIP: NEGATIVE
BUN SERPL-MCNC: 16 MG/DL (ref 5–25)
CALCIUM SERPL-MCNC: 8.9 MG/DL (ref 8.3–10.1)
CHLORIDE SERPL-SCNC: 107 MMOL/L (ref 100–108)
CLARITY UR: CLEAR
CO2 SERPL-SCNC: 28 MMOL/L (ref 21–32)
COLOR UR: YELLOW
CREAT SERPL-MCNC: 0.98 MG/DL (ref 0.6–1.3)
EOSINOPHIL # BLD AUTO: 0.16 THOUSAND/ΜL (ref 0–0.61)
EOSINOPHIL NFR BLD AUTO: 2 % (ref 0–6)
ERYTHROCYTE [DISTWIDTH] IN BLOOD BY AUTOMATED COUNT: 12.7 % (ref 11.6–15.1)
GFR SERPL CREATININE-BSD FRML MDRD: 77 ML/MIN/1.73SQ M
GLUCOSE SERPL-MCNC: 121 MG/DL (ref 65–140)
GLUCOSE UR STRIP-MCNC: NEGATIVE MG/DL
HCT VFR BLD AUTO: 35.5 % (ref 36.5–49.3)
HGB BLD-MCNC: 12.2 G/DL (ref 12–17)
HGB UR QL STRIP.AUTO: ABNORMAL
KETONES UR STRIP-MCNC: ABNORMAL MG/DL
LEUKOCYTE ESTERASE UR QL STRIP: NEGATIVE
LIPASE SERPL-CCNC: 126 U/L (ref 73–393)
LYMPHOCYTES # BLD AUTO: 1.14 THOUSANDS/ΜL (ref 0.6–4.47)
LYMPHOCYTES NFR BLD AUTO: 17 % (ref 14–44)
MCH RBC QN AUTO: 32.1 PG (ref 26.8–34.3)
MCHC RBC AUTO-ENTMCNC: 34.4 G/DL (ref 31.4–37.4)
MCV RBC AUTO: 93 FL (ref 82–98)
MONOCYTES # BLD AUTO: 0.41 THOUSAND/ΜL (ref 0.17–1.22)
MONOCYTES NFR BLD AUTO: 6 % (ref 4–12)
NEUTROPHILS # BLD AUTO: 5.12 THOUSANDS/ΜL (ref 1.85–7.62)
NEUTS SEG NFR BLD AUTO: 75 % (ref 43–75)
NITRITE UR QL STRIP: NEGATIVE
NON-SQ EPI CELLS URNS QL MICRO: ABNORMAL /HPF
PH UR STRIP.AUTO: 5.5 [PH] (ref 4.5–8)
PLATELET # BLD AUTO: 203 THOUSANDS/UL (ref 149–390)
PMV BLD AUTO: 11.2 FL (ref 8.9–12.7)
POTASSIUM SERPL-SCNC: 3.9 MMOL/L (ref 3.5–5.3)
PROT SERPL-MCNC: 6.8 G/DL (ref 6.4–8.2)
PROT UR STRIP-MCNC: NEGATIVE MG/DL
RBC # BLD AUTO: 3.8 MILLION/UL (ref 3.88–5.62)
RBC #/AREA URNS AUTO: ABNORMAL /HPF
SODIUM SERPL-SCNC: 144 MMOL/L (ref 136–145)
SP GR UR STRIP.AUTO: 1.02 (ref 1–1.03)
URATE CRY URNS QL MICRO: ABNORMAL /HPF
UROBILINOGEN UR QL STRIP.AUTO: 0.2 E.U./DL
WBC # BLD AUTO: 6.85 THOUSAND/UL (ref 4.31–10.16)
WBC #/AREA URNS AUTO: ABNORMAL /HPF

## 2018-06-13 PROCEDURE — 74177 CT ABD & PELVIS W/CONTRAST: CPT

## 2018-06-13 PROCEDURE — 36415 COLL VENOUS BLD VENIPUNCTURE: CPT | Performed by: PHYSICIAN ASSISTANT

## 2018-06-13 PROCEDURE — 83690 ASSAY OF LIPASE: CPT | Performed by: PHYSICIAN ASSISTANT

## 2018-06-13 PROCEDURE — 96360 HYDRATION IV INFUSION INIT: CPT

## 2018-06-13 PROCEDURE — 99284 EMERGENCY DEPT VISIT MOD MDM: CPT

## 2018-06-13 PROCEDURE — 81001 URINALYSIS AUTO W/SCOPE: CPT | Performed by: PHYSICIAN ASSISTANT

## 2018-06-13 PROCEDURE — 80053 COMPREHEN METABOLIC PANEL: CPT | Performed by: PHYSICIAN ASSISTANT

## 2018-06-13 PROCEDURE — 85025 COMPLETE CBC W/AUTO DIFF WBC: CPT | Performed by: PHYSICIAN ASSISTANT

## 2018-06-13 RX ORDER — MULTIVIT-MIN/IRON FUM/FOLIC AC 7.5 MG-4
1 TABLET ORAL DAILY
COMMUNITY

## 2018-06-13 RX ORDER — ASPIRIN 81 MG/1
81 TABLET, CHEWABLE ORAL DAILY
COMMUNITY

## 2018-06-13 RX ORDER — EZETIMIBE 10 MG/1
10 TABLET ORAL DAILY
COMMUNITY

## 2018-06-13 RX ORDER — ROSUVASTATIN CALCIUM 5 MG/1
5 TABLET, COATED ORAL DAILY
COMMUNITY
End: 2021-04-28 | Stop reason: HOSPADM

## 2018-06-13 RX ORDER — FUROSEMIDE 20 MG/1
20 TABLET ORAL DAILY
COMMUNITY

## 2018-06-13 RX ADMIN — IOHEXOL 100 ML: 350 INJECTION, SOLUTION INTRAVENOUS at 10:05

## 2018-06-13 RX ADMIN — SODIUM CHLORIDE 1000 ML: 0.9 INJECTION, SOLUTION INTRAVENOUS at 08:56

## 2018-06-13 NOTE — DISCHARGE INSTRUCTIONS
Acetaminophen 650mg by mouth every 8 hours as needed for mild to moderate pain or fever  Abdominal Pain, Ambulatory Care   GENERAL INFORMATION:   Abdominal pain  can be dull, achy, or sharp  You may have pain in one area of your abdomen, or in your entire abdomen  Your pain may be caused by constipation, food sensitivity or poisoning, infection, or a blockage  Abdominal pain can also be caused by a hernia, appendicitis, or an ulcer  The cause of your abdominal pain may be unknown  Seek immediate care for the following symptoms:   · New chest pain or shortness of breath    · Pulsing pain in your upper abdomen or lower back that suddenly becomes constant    · Pain in the right lower abdominal area that worsens with movement    · Fever over 100 4°F (38°C) or shaking chills    · Vomiting and you cannot keep food or fluids down    · Pain does not improve or gets worse over the next 8 to 12 hours    · Blood in your vomit or bowel movements, or they look black and tarry    · Skin or the whites of your eyes turn yellow    · Large amount of vaginal bleeding that is not your monthly period  Treatment for abdominal pain  may include medicine to calm your stomach, prevent vomiting, or decrease pain  Follow up with your healthcare provider as directed:  Write down your questions so you remember to ask them during your visits  CARE AGREEMENT:   You have the right to help plan your care  Learn about your health condition and how it may be treated  Discuss treatment options with your caregivers to decide what care you want to receive  You always have the right to refuse treatment  The above information is an  only  It is not intended as medical advice for individual conditions or treatments  Talk to your doctor, nurse or pharmacist before following any medical regimen to see if it is safe and effective for you    © 2014 [employer] is for End User's use only and may not be sold, redistributed or otherwise used for commercial purposes  All illustrations and images included in CareNotes® are the copyrighted property of [employer]  or [employer]aise [employer]

## 2018-06-13 NOTE — ED PROVIDER NOTES
History  Chief Complaint   Patient presents with    Abdominal Pain     Pt had recent prostatectomy on Friday  Pt state he leaned over this morning to empty his catheter bag and he got 10/10 pain in his lower abd under the incision sites and broke out is sweats  Pt states it tooke about 10 minutes and the pain relieved itself  67 y/o M with past medical history of COPD, hypertension, prostate cancer status post prostatectomy performed 5 days prior by Dr Michael Lobo ([phone]), who presents to the emergency department for acute onset of lower abdominal pain yesterday, which resolved, and then again today the lasted for approximately 30 min before slowly improving  Patient states that he bent over to try to empty his catheter back, and when he stood up he experienced a 10/10 sharp and cramping pain to the bilateral lower quadrants  This resulted in him experiencing diaphoresis and nausea  Currently in the emergency department, the patient describes the pain as a 3/10 pain  He is declining pain medication  Denies vomiting, diarrhea, constipation, urinary pain/frequency/urgency, hematuria  Denies any fevers or chills  Denies chest pain or shortness of breath  Did not take anything for pain prior to arrival         History provided by:  Patient   used: No        Prior to Admission Medications   Prescriptions Last Dose Informant Patient Reported? Taking?    CEFUROXIME AXETIL PO   Yes Yes   Sig: Take 1 tablet by mouth 2 (two) times a day     METOPROLOL TARTRATE PO   Yes Yes   Sig: Take 25 mg by mouth 2 (two) times a day   Multiple Vitamins-Minerals (MULTIVITAMIN WITH MINERALS) tablet   Yes Yes   Sig: Take 1 tablet by mouth daily   POTASSIUM CHLORIDE ER PO   Yes Yes   Sig: Take 10 mEq by mouth daily   aspirin 81 mg chewable tablet   Yes Yes   Sig: Chew 81 mg daily   ezetimibe (ZETIA) 10 mg tablet   Yes Yes   Sig: Take 10 mg by mouth daily   furosemide (LASIX) 20 mg tablet   Yes Yes   Sig: Take 20 mg by mouth daily   rosuvastatin (CRESTOR) 5 mg tablet   Yes Yes   Sig: Take 5 mg by mouth daily      Facility-Administered Medications: None       Past Medical History:   Diagnosis Date    Cancer ([facility] )     prostate    COPD (chronic obstructive pulmonary disease) ([facility] )     Essential hypertension     Kidney stone        Past Surgical History:   Procedure Laterality Date    CATARACT EXTRACTION      HAND SURGERY      KNEE SURGERY      PROSTATECTOMY         History reviewed  No pertinent family history  I have reviewed and agree with the history as documented  Social History   Substance Use Topics    Smoking status: Never Smoker    Smokeless tobacco: Never Used    Alcohol use Yes      Comment: socially         Review of Systems   Constitutional: Negative for chills and fever  HENT: Negative for congestion, ear pain, postnasal drip, rhinorrhea, sinus pain, sinus pressure, sore throat and trouble swallowing  Eyes: Negative  Respiratory: Negative for cough, chest tightness, shortness of breath and wheezing  Cardiovascular: Negative for chest pain, palpitations and leg swelling  Gastrointestinal: Positive for abdominal pain and nausea  Negative for anal bleeding, constipation, diarrhea and vomiting  Genitourinary: Negative for dysuria, flank pain, frequency, hematuria and urgency  Musculoskeletal: Negative for arthralgias, back pain, gait problem, joint swelling, myalgias, neck pain and neck stiffness  Skin: Negative for color change, pallor, rash and wound  Neurological: Negative for dizziness, syncope, weakness, light-headedness, numbness and headaches  Psychiatric/Behavioral: Negative  Physical Exam  Physical Exam   Constitutional: He is oriented to person, place, and time  He appears well-developed and well-nourished  No distress  HENT:   Head: Normocephalic and atraumatic  Eyes: Conjunctivae and EOM are normal  Pupils are equal, round, and reactive to light  Neck: Normal range of motion  Neck supple  Cardiovascular: Normal rate, regular rhythm and intact distal pulses  Pulmonary/Chest: Effort normal and breath sounds normal  He has no wheezes  He has no rales  Abdominal: Soft  Bowel sounds are normal  He exhibits no distension  There is tenderness (Bilateral lower quadrants  No suprapubic tenderness on palpation  )  There is no rebound and no guarding  Genitourinary: Penis normal    Genitourinary Comments: Catheter in place  Musculoskeletal: Normal range of motion  He exhibits no edema or tenderness  Neurological: He is alert and oriented to person, place, and time  No cranial nerve deficit or sensory deficit  He exhibits normal muscle tone  Coordination normal    Skin: Skin is warm and dry  Capillary refill takes less than 2 seconds  He is not diaphoretic  There is healing and purple ecchymosis to the bilateral lower abdominal quadrants  Extension sites appear clean with overlying glue  No surrounding erythema or purulent discharge  Psychiatric: He has a normal mood and affect  His behavior is normal    Nursing note and vitals reviewed        Vital Signs  ED Triage Vitals [06/13/18 0816]   Temperature Pulse Respirations Blood Pressure SpO2   98 2 °F (36 8 °C) 67 18 162/75 98 %      Temp Source Heart Rate Source Patient Position - Orthostatic VS BP Location FiO2 (%)   Oral Monitor Lying Right arm --      Pain Score       3           Vitals:    06/13/18 0816 06/13/18 0930 06/13/18 1030 06/13/18 1100   BP: 162/75 149/72 167/77 157/75   Pulse: 67 (!) 54 56 56   Patient Position - Orthostatic VS: Lying          Visual Acuity      ED Medications  Medications   sodium chloride 0 9 % bolus 1,000 mL (0 mL Intravenous Stopped 6/13/18 0956)   iohexol (OMNIPAQUE) 350 MG/ML injection (MULTI-DOSE) 100 mL (100 mL Intravenous Given 6/13/18 1005)       Diagnostic Studies  Results Reviewed     Procedure Component Value Units Date/Time    Comprehensive metabolic panel [[id number]]  (Abnormal) Collected:  06/13/18 0854    Lab Status:  Final result Specimen:  Blood from Arm, Right Updated:  06/13/18 0920     Sodium 144 mmol/L      Potassium 3 9 mmol/L      Chloride 107 mmol/L      CO2 28 mmol/L      Anion Gap 9 mmol/L      BUN 16 mg/dL      Creatinine 0 98 mg/dL      Glucose 121 mg/dL      Calcium 8 9 mg/dL      AST 24 U/L      ALT 28 U/L      Alkaline Phosphatase 42 (L) U/L      Total Protein 6 8 g/dL      Albumin 3 1 (L) g/dL      Total Bilirubin 0 70 mg/dL      eGFR 77 ml/min/1 73sq m     Narrative:         National Kidney Disease Education Program recommendations are as follows:  GFR calculation is accurate only with a steady state creatinine  Chronic Kidney disease less than 60 ml/min/1 73 sq  meters  Kidney failure less than 15 ml/min/1 73 sq  meters      Lipase [[id number]]  (Normal) Collected:  06/13/18 0854    Lab Status:  Final result Specimen:  Blood from Arm, Right Updated:  06/13/18 0920     Lipase 126 u/L     Urine Microscopic [[id number]]  (Abnormal) Collected:  06/13/18 0855    Lab Status:  Final result Specimen:  Urine from Urine, Indwelling Pastor Catheter Updated:  06/13/18 0916     RBC, UA 30-50 (A) /hpf      WBC, UA 0-1 (A) /hpf      Epithelial Cells Occasional /hpf      Bacteria, UA Occasional /hpf      Uric Acid Mary Lou, UA Moderate /hpf     CBC and differential [[id number]]  (Abnormal) Collected:  06/13/18 0854    Lab Status:  Final result Specimen:  Blood from Arm, Right Updated:  06/13/18 0904     WBC 6 85 Thousand/uL      RBC 3 80 (L) Million/uL      Hemoglobin 12 2 g/dL      Hematocrit 35 5 (L) %      MCV 93 fL      MCH 32 1 pg      MCHC 34 4 g/dL      RDW 12 7 %      MPV 11 2 fL      Platelets 089 Thousands/uL      Neutrophils Relative 75 %      Lymphocytes Relative 17 %      Monocytes Relative 6 %      Eosinophils Relative 2 %      Basophils Relative 0 %      Neutrophils Absolute 5 12 Thousands/µL      Lymphocytes Absolute 1 14 Thousands/µL      Monocytes Absolute 0 41 Thousand/µL      Eosinophils Absolute 0 16 Thousand/µL      Basophils Absolute 0 02 Thousands/µL     UA w Reflex to Microscopic w Reflex to Culture [[id number]]  (Abnormal) Collected:  06/13/18 0855    Lab Status:  Final result Specimen:  Urine from Urine, Indwelling Pastor Catheter Updated:  06/13/18 0903     Color, UA Yellow     Clarity, UA Clear     Specific Gravity, UA 1 025     pH, UA 5 5     Leukocytes, UA Negative     Nitrite, UA Negative     Protein, UA Negative mg/dl      Glucose, UA Negative mg/dl      Ketones, UA Trace (A) mg/dl      Urobilinogen, UA 0 2 E U /dl      Bilirubin, UA Negative     Blood, UA Large (A)                 CT abdomen pelvis with contrast   Final Result by Monique Avilez MD (06/13 1042)      Postoperative changes of recent prostatectomy including 2 small loculated fluid collections within the right and left hemipelvis  However, these small fluid collections demonstrate no peripheral enhancement/encapsulation or air to suggest an abscess  Free intraperitoneal air is also noted within the upper abdomen likely postoperative in nature  Mild right-sided hydronephrosis and hydroureter with no evidence of an obstructing ureteral calculus  There is also mild fullness of the left renal collecting system and ureter with no obstructing calculus  Scattered colonic diverticulosis with no inflammatory changes present to suggest acute diverticulitis  Workstation performed: [id number]                    Procedures  Procedures       Phone Contacts  ED Phone Contact    ED Course  ED Course as of Jun 13 1542 Wed Jun 13, 2018   0900 Patient declining pain Rx on arrival in the ED     1148 Spoke with Dr Binh Reis, urology surgeon regarding the CT abdomen pelvis results  States likely seroma  Recommends discharge home with follow up in 1-2 weeks                                   MDM  Number of Diagnoses or Management Options  Abdominal pain:   Postoperative seroma involving genitourinary system after genitourinary procedure:   Status post prostatectomy:   Diagnosis management comments: 67 y/o M with past medical history of COPD, hypertension, prostate cancer status post prostatectomy performed 5 days prior by Dr Francisco Kim ([phone]), who presents to the emergency department for acute onset of lower abdominal pain yesterday, which resolved, and then again today the lasted for approximately 30 min before slowly improving  Differential Diagnosis includes but is not limited to: Abscess, seroma, perforation, urinary tract infection, urinoma  UA shows ketones and blood, consistent with surgery  CT scan shows 2 small fluid collections  Not consistent with abscess  Labs are otherwise generally unremarkable  Discussed CT abdomen/pelvis findings with Dr Francisco Kim (urology surgeon, [phone]), agrees with discharge home with follow up in office  Keep existing appointment  Continue taking abx  Patient declined pain Rx in the ED  Pt re-examined and evaluated after testing and treatment  Spoke with the patient and feeling improved and sxs have resolved  Will discharge home with close f/u with pcp and instructed to return to the ED if sxs worsen or continue  Pt agrees with the plan for discharge and feels comfortable to go home with proper f/u  Advised to return for worsening or additional problems  Diagnostic tests were reviewed and questions answered  Diagnosis, care plan and treatment options were discussed  The patient understand instructions and will follow up as directed          Amount and/or Complexity of Data Reviewed  Clinical lab tests: ordered and reviewed  Tests in the radiology section of CPT®: ordered and reviewed  Discuss the patient with other providers: yes      CritCare Time    Disposition  Final diagnoses:   Status post prostatectomy   Abdominal pain   Postoperative seroma involving genitourinary system after genitourinary procedure     Time reflects when diagnosis was documented in both MDM as applicable and the Disposition within this note     Time User Action Codes Description Comment    6/13/2018 11:56 AM Francie Gosselin Add [Z90 79] Status post prostatectomy     6/13/2018 11:56 AM Francie Gosselin Add [R10 9] Abdominal pain     6/13/2018 11:56 AM Francie Gosselin Add [O14 450] Postoperative seroma involving genitourinary system after genitourinary procedure     6/13/2018 11:56 AM Pilar Hayeselin Modify [Z90 79] Status post prostatectomy     6/13/2018 11:56 AM Francie Gosselin Modify [C82 637] Postoperative seroma involving genitourinary system after genitourinary procedure       ED Disposition     ED Disposition Condition Comment    Discharge  Marcella Perry discharge to home/self care  Condition at discharge: Good        Follow-up Information     Follow up With Specialties Details Why Mikayla Huffman Edis your appointment  Discharge Medication List as of 6/13/2018 11:57 AM      CONTINUE these medications which have NOT CHANGED    Details   aspirin 81 mg chewable tablet Chew 81 mg daily, Historical Med      CEFUROXIME AXETIL PO Take 1 tablet by mouth 2 (two) times a day  , Starting Mon 6/11/2018, Until Sun 6/17/2018, Historical Med      ezetimibe (ZETIA) 10 mg tablet Take 10 mg by mouth daily, Historical Med      furosemide (LASIX) 20 mg tablet Take 20 mg by mouth daily, Historical Med      METOPROLOL TARTRATE PO Take 25 mg by mouth 2 (two) times a day, Historical Med      Multiple Vitamins-Minerals (MULTIVITAMIN WITH MINERALS) tablet Take 1 tablet by mouth daily, Historical Med      POTASSIUM CHLORIDE ER PO Take 10 mEq by mouth daily, Historical Med      rosuvastatin (CRESTOR) 5 mg tablet Take 5 mg by mouth daily, Historical Med           No discharge procedures on file      ED Provider  Electronically Signed by           Trixie Bello PA-C  06/13/18 5804

## 2018-11-08 LAB
CASE RPRT: NORMAL
CLINICAL INFO: NORMAL
PATH REPORT.ADDENDUM SPEC: NORMAL
PATH REPORT.FINAL DX SPEC: NORMAL
PATH REPORT.FINAL DX SPEC: NORMAL
PATH REPORT.GROSS SPEC: NORMAL
PATHOLOGY SYNOPTIC REPORT: NORMAL

## 2021-04-26 ENCOUNTER — APPOINTMENT (EMERGENCY)
Dept: CT IMAGING | Facility: HOSPITAL | Age: 76
End: 2021-04-26
Payer: COMMERCIAL

## 2021-04-26 ENCOUNTER — APPOINTMENT (EMERGENCY)
Dept: RADIOLOGY | Facility: HOSPITAL | Age: 76
End: 2021-04-26
Payer: COMMERCIAL

## 2021-04-26 ENCOUNTER — HOSPITAL ENCOUNTER (OUTPATIENT)
Facility: HOSPITAL | Age: 76
Setting detail: OBSERVATION
Discharge: HOME | End: 2021-04-28
Attending: EMERGENCY MEDICINE | Admitting: INTERNAL MEDICINE
Payer: COMMERCIAL

## 2021-04-26 DIAGNOSIS — G45.9 TIA (TRANSIENT ISCHEMIC ATTACK): ICD-10-CM

## 2021-04-26 DIAGNOSIS — I63.9 STROKE (HCC): Primary | ICD-10-CM

## 2021-04-26 PROBLEM — R06.00 DYSPNEA: Status: ACTIVE | Noted: 2021-04-26

## 2021-04-26 PROBLEM — I48.91 ATRIAL FIBRILLATION (HCC): Status: ACTIVE | Noted: 2021-04-26

## 2021-04-26 LAB
ANION GAP SERPL CALCULATED.3IONS-SCNC: 9 MMOL/L (ref 4–13)
APTT PPP: 32 SECONDS (ref 23–31)
BUN SERPL-MCNC: 20 MG/DL (ref 6–20)
CALCIUM SERPL-MCNC: 9.9 MG/DL (ref 8.4–10.2)
CHLORIDE SERPL-SCNC: 106 MMOL/L (ref 96–108)
CO2 SERPL-SCNC: 27 MMOL/L (ref 22–33)
CREAT SERPL-MCNC: 1.11 MG/DL (ref 0.5–1.2)
ERYTHROCYTE [DISTWIDTH] IN BLOOD BY AUTOMATED COUNT: 12.9 % (ref 11.6–15.1)
GFR SERPL CREATININE-BSD FRML MDRD: 65 ML/MIN/1.73SQ M
GLUCOSE SERPL-MCNC: 138 MG/DL (ref 65–140)
GLUCOSE SERPL-MCNC: 143 MG/DL (ref 65–140)
HCT VFR BLD AUTO: 43.7 % (ref 36.5–49.3)
HGB BLD-MCNC: 14.3 G/DL (ref 12–17)
INR PPP: 0.97 (ref 0.9–1.1)
MCH RBC QN AUTO: 31.2 PG (ref 26.8–34.3)
MCHC RBC AUTO-ENTMCNC: 32.7 G/DL (ref 31.4–37.4)
MCV RBC AUTO: 95 FL (ref 82–98)
PLATELET # BLD AUTO: 225 THOUSANDS/UL (ref 149–390)
PMV BLD AUTO: 12 FL (ref 8.9–12.7)
POTASSIUM SERPL-SCNC: 3.9 MMOL/L (ref 3.5–5)
PROTHROMBIN TIME: 11 SECONDS (ref 9.5–12.1)
RBC # BLD AUTO: 4.59 MILLION/UL (ref 3.88–5.62)
SARS-COV-2 RNA RESP QL NAA+PROBE: NEGATIVE
SODIUM SERPL-SCNC: 142 MMOL/L (ref 133–145)
TROPONIN I SERPL-MCNC: 0.07 NG/ML (ref 0–0.07)
WBC # BLD AUTO: 6.84 THOUSAND/UL (ref 4.31–10.16)

## 2021-04-26 PROCEDURE — 85730 THROMBOPLASTIN TIME PARTIAL: CPT | Performed by: EMERGENCY MEDICINE

## 2021-04-26 PROCEDURE — 93005 ELECTROCARDIOGRAM TRACING: CPT

## 2021-04-26 PROCEDURE — 80048 BASIC METABOLIC PNL TOTAL CA: CPT | Performed by: EMERGENCY MEDICINE

## 2021-04-26 PROCEDURE — 71045 X-RAY EXAM CHEST 1 VIEW: CPT

## 2021-04-26 PROCEDURE — 36415 COLL VENOUS BLD VENIPUNCTURE: CPT | Performed by: EMERGENCY MEDICINE

## 2021-04-26 PROCEDURE — 70496 CT ANGIOGRAPHY HEAD: CPT

## 2021-04-26 PROCEDURE — 85027 COMPLETE CBC AUTOMATED: CPT | Performed by: EMERGENCY MEDICINE

## 2021-04-26 PROCEDURE — 99219 PR INITIAL OBSERVATION CARE/DAY 50 MINUTES: CPT | Performed by: INTERNAL MEDICINE

## 2021-04-26 PROCEDURE — 87635 SARS-COV-2 COVID-19 AMP PRB: CPT | Performed by: EMERGENCY MEDICINE

## 2021-04-26 PROCEDURE — 70498 CT ANGIOGRAPHY NECK: CPT

## 2021-04-26 PROCEDURE — 82948 REAGENT STRIP/BLOOD GLUCOSE: CPT

## 2021-04-26 PROCEDURE — 84484 ASSAY OF TROPONIN QUANT: CPT | Performed by: EMERGENCY MEDICINE

## 2021-04-26 PROCEDURE — 99285 EMERGENCY DEPT VISIT HI MDM: CPT | Performed by: EMERGENCY MEDICINE

## 2021-04-26 PROCEDURE — 85610 PROTHROMBIN TIME: CPT | Performed by: EMERGENCY MEDICINE

## 2021-04-26 PROCEDURE — 99285 EMERGENCY DEPT VISIT HI MDM: CPT

## 2021-04-26 PROCEDURE — U0003 INFECTIOUS AGENT DETECTION BY NUCLEIC ACID (DNA OR RNA); SEVERE ACUTE RESPIRATORY SYNDROME CORONAVIRUS 2 (SARS-COV-2) (CORONAVIRUS DISEASE [COVID-19]), AMPLIFIED PROBE TECHNIQUE, MAKING USE OF HIGH THROUGHPUT TECHNOLOGIES AS DESCRIBED BY CMS-2020-01-R: HCPCS | Performed by: EMERGENCY MEDICINE

## 2021-04-26 RX ORDER — CLOPIDOGREL BISULFATE 75 MG/1
75 TABLET ORAL ONCE
Status: COMPLETED | OUTPATIENT
Start: 2021-04-26 | End: 2021-04-26

## 2021-04-26 RX ORDER — CLOPIDOGREL BISULFATE 75 MG/1
75 TABLET ORAL DAILY
Status: DISCONTINUED | OUTPATIENT
Start: 2021-04-27 | End: 2021-04-28 | Stop reason: HOSPADM

## 2021-04-26 RX ORDER — ASPIRIN 81 MG/1
81 TABLET, CHEWABLE ORAL DAILY
Status: DISCONTINUED | OUTPATIENT
Start: 2021-04-27 | End: 2021-04-28 | Stop reason: HOSPADM

## 2021-04-26 RX ORDER — EZETIMIBE 10 MG/1
10 TABLET ORAL DAILY
Status: DISCONTINUED | OUTPATIENT
Start: 2021-04-27 | End: 2021-04-28 | Stop reason: HOSPADM

## 2021-04-26 RX ORDER — ATORVASTATIN CALCIUM 40 MG/1
40 TABLET, FILM COATED ORAL EVERY EVENING
Status: DISCONTINUED | OUTPATIENT
Start: 2021-04-26 | End: 2021-04-28 | Stop reason: HOSPADM

## 2021-04-26 RX ORDER — ASPIRIN 81 MG/1
324 TABLET ORAL ONCE
Status: COMPLETED | OUTPATIENT
Start: 2021-04-26 | End: 2021-04-26

## 2021-04-26 RX ORDER — HEPARIN SODIUM 5000 [USP'U]/ML
5000 INJECTION, SOLUTION INTRAVENOUS; SUBCUTANEOUS EVERY 8 HOURS SCHEDULED
Status: DISCONTINUED | OUTPATIENT
Start: 2021-04-26 | End: 2021-04-28 | Stop reason: HOSPADM

## 2021-04-26 RX ADMIN — ASPIRIN 324 MG: 81 TABLET, DELAYED RELEASE ORAL at 17:17

## 2021-04-26 RX ADMIN — IOHEXOL 100 ML: 350 INJECTION, SOLUTION INTRAVENOUS at 16:44

## 2021-04-26 RX ADMIN — HEPARIN SODIUM 5000 UNITS: 5000 INJECTION INTRAVENOUS; SUBCUTANEOUS at 21:19

## 2021-04-26 RX ADMIN — CLOPIDOGREL BISULFATE 75 MG: 75 TABLET ORAL at 17:17

## 2021-04-26 RX ADMIN — ATORVASTATIN CALCIUM 40 MG: 40 TABLET, FILM COATED ORAL at 20:20

## 2021-04-26 NOTE — PLAN OF CARE
Problem: Potential for Falls  Goal: Patient will remain free of falls  Description: INTERVENTIONS:  - Assess patient frequently for physical needs  -  Identify cognitive and physical deficits and behaviors that affect risk of falls    -  [place] fall precautions as indicated by assessment   - Educate patient/family on patient safety including physical limitations  - Instruct patient to call for assistance with activity based on assessment  - Modify environment to reduce risk of injury  - Consider OT/PT consult to assist with strengthening/mobility  Outcome: Progressing     Problem: PAIN - ADULT  Goal: Verbalizes/displays adequate comfort level or baseline comfort level  Description: Interventions:  - Encourage patient to monitor pain and request assistance  - Assess pain using appropriate pain scale  - Administer analgesics based on type and severity of pain and evaluate response  - Implement non-pharmacological measures as appropriate and evaluate response  - Consider cultural and social influences on pain and pain management  - Notify physician/advanced practitioner if interventions unsuccessful or patient reports new pain  Outcome: Progressing     Problem: INFECTION - ADULT  Goal: Absence or prevention of progression during hospitalization  Description: INTERVENTIONS:  - Assess and monitor for signs and symptoms of infection  - Monitor lab/diagnostic results  - Monitor all insertion sites, i e  indwelling lines, tubes, and drains  - Monitor endotracheal if appropriate and nasal secretions for changes in amount and color  - [place] appropriate cooling/warming therapies per order  - Administer medications as ordered  - Instruct and encourage patient and family to use good hand hygiene technique  - Identify and instruct in appropriate isolation precautions for identified infection/condition  Outcome: Progressing     Problem: SAFETY ADULT  Goal: Patient will remain free of falls  Description: INTERVENTIONS:  - Assess patient frequently for physical needs  -  Identify cognitive and physical deficits and behaviors that affect risk of falls    -  [place] fall precautions as indicated by assessment   - Educate patient/family on patient safety including physical limitations  - Instruct patient to call for assistance with activity based on assessment  - Modify environment to reduce risk of injury  - Consider OT/PT consult to assist with strengthening/mobility  Outcome: Progressing  Goal: Maintain or return to baseline ADL function  Description: INTERVENTIONS:  -  Assess patient's ability to carry out ADLs; assess patient's baseline for ADL function and identify physical deficits which impact ability to perform ADLs (bathing, care of mouth/teeth, toileting, grooming, dressing, etc )  - Assess/evaluate cause of self-care deficits   - Assess range of motion  - Assess patient's mobility; develop plan if impaired  - Assess patient's need for assistive devices and provide as appropriate  - Encourage maximum independence but intervene and supervise when necessary  - Involve family in performance of ADLs  - Assess for home care needs following discharge   - Consider OT consult to assist with ADL evaluation and planning for discharge  - Provide patient education as appropriate  Outcome: Progressing     Problem: DISCHARGE PLANNING  Goal: Discharge to home or other facility with appropriate resources  Description: INTERVENTIONS:  - Identify barriers to discharge w/patient and caregiver  - Arrange for needed discharge resources and transportation as appropriate  - Identify discharge learning needs (meds, wound care, etc )  - Arrange for interpretive services to assist at discharge as needed  - Refer to Case Management Department for coordinating discharge planning if the patient needs post-hospital services based on physician/advanced practitioner order or complex needs related to functional status, cognitive ability, or social support system  Outcome: Progressing     Problem: Knowledge Deficit  Goal: Patient/family/caregiver demonstrates understanding of disease process, treatment plan, medications, and discharge instructions  Description: Complete learning assessment and assess knowledge base  Interventions:  - Provide teaching at level of understanding  - Provide teaching via preferred learning methods  Outcome: Progressing     Problem: Neurological Deficit  Goal: Neurological status is stable or improving  Description: Interventions:  - Monitor and assess patient's level of consciousness, motor function, sensory function, and level of assistance needed for ADLs  - Monitor and report changes from baseline  Collaborate with interdisciplinary team to initiate plan and implement interventions as ordered  - Provide and maintain a safe environment  - Consider seizure precautions  - Consider fall precautions  - Consider aspiration precautions  - Consider bleeding precautions  Outcome: Progressing     Problem: Activity Intolerance/Impaired Mobility  Goal: Mobility/activity is maintained at optimum level for patient  Description: Interventions:  - Assess and monitor patient  barriers to mobility and need for assistive/adaptive devices  - Assess patient's emotional response to limitations  - Collaborate with interdisciplinary team and initiate plans and interventions as ordered  - Encourage independent activity per ability   - Maintain proper body alignment  - Perform active/passive rom as tolerated/ordered  - Plan activities to conserve energy   - Turn patient as appropriate  Outcome: Progressing     Problem: Communication Impairment  Goal: Ability to express needs and understand communication  Description: Assess patient's communication skills and ability to understand information  Patient will demonstrate use of effective communication techniques, alternative methods of communication and understanding even if not able to speak       - Encourage communication and provide alternate methods of communication as needed  - Collaborate with case management/ for discharge needs  - Include patient/family/caregiver in decisions related to communication    Outcome: Progressing

## 2021-04-26 NOTE — QUICK NOTE
Stroke alert activated by Dr Diana Monahan in [place] [facility] ([phone]) via the emergency  at [phone] today  Patient is a 77year old man with HTN, prostate CA s/p prostatectomy in 2018, previously on eliquis for unclear indication, presenting with transient slurred speech and R sided facial weakness, along with word finding difficulty, resolved at time of arrival to the ED  On exam in the ED:   /69  Awake, alert, following commands  Language is normal, with normal naming, and fluency  No cranial nerve deficits, no facial weakness  MOTOR: no drift  5/5 throughout  COORDINATION: normal     NIHSS = 0       CTH images reviewed: no hemorrhage  Intact grey white differentiation  Symmetric sulcation  CTA H&N images reviewed: mild atherosclerotic changes bilateral carotid bifurcations  No large vessel occlusion  AP: 77year old man with HTN, prostate CA, with transient word finding difficulty and facial weakness, resolved in the ED   NIHSS 0, and imaging unremarkable  TPA not indicated in this patient as he is asymptomatic at this time  No LVO for thrombectomy to target  Would load with ASA and plavix continue on DAPT for 21 days  Admit for MRI, echo, cardiac tele monitoring  Formal neurology consultation

## 2021-04-26 NOTE — ASSESSMENT & PLAN NOTE
-not an accurate historian, is not aware if he has heart failure, does follow with outpatient cardiology, reports he has had multiple echoes done in the past  -echo reports not available on chart review; inpatient echo done   -inpatient echo: Moderately reduced systolic function with  EF of 35-40%, severe diffuse hypokinesis with distinct regional wall motion abnormalities    Wall thickness moderately increased with moderate concentric hypertrophy  -patient presented with dyspnea for the last few weeks, associated with bilateral lower extremity pitting edema extending up to knees  -patient's home medication regimen should include Lasix 20 mg on Mondays Wednesdays and Fridays, however he is noncompliant  -denies any recent weight gains  -no prior echo on chart review      -restarting metoprolol tartrate and Lasix at home dosages

## 2021-04-26 NOTE — ASSESSMENT & PLAN NOTE
-patient also reporting dyspnea for the last few weeks, associated with bilateral lower extremity pitting edema extending up to knees  -patient's home medication regimen should include Lasix 20 mg on Mondays Wednesdays and Fridays, however he is noncompliant  -denies any recent weight gains  -no prior echo on chart review    -echo being ordered as part of TIA workup  -holding off on metoprolol tartrate and Lasix as part of permissive hypertension  -can consider cardiology consult based upon echo

## 2021-04-26 NOTE — ASSESSMENT & PLAN NOTE
-current blood pressure acceptable  -holding off on home medications of metoprolol tartrate 25 mg BD and irbesartan 150 mg OD for permissive hypertension

## 2021-04-26 NOTE — ASSESSMENT & PLAN NOTE
 Presented with aphasia, left-sided facial droop, left upper extremity paresthesias  Last well known time:  Approximately 3:30 p m  Symptoms resolved within approximately 10 to 15 minutes   Risk factors of atrial fibrillation, hypertension, medication noncompliance, pre diabetes   Home medication regimen:  Aspirin 81 mg OD, Zetia 10 mg OD, metoprolol tartrate 25 mg BD, rosuvastatin 10 mg OD, irbesartan 150 mg OD; patient has run out of his medications a few weeks ago has not yet gotten refills   Neuro exam findings:  No focal neurological deficits, A X O X 3  CT head findings:  No acute intracranial abnormality   NIH score: Of 0 at presentation; 0 at time of my evaluation   Neurology consulted:  Recommending continuation on Lipitor, okay to restart Eliquis of MRI does not show however   HbA1c:  6 6   Lipid panel:  Non impressive     Neuro checks   MRI brain ordered; will likely get done around 7:30 p m  Today   Continuing on Aspirin 81 mg  Continuing on Zetia 10 mg   Starting on Atorvastatin 40 mg-   follow-up TSH   follow-up B1, B12, vitamin-D   follow-up protein electrophoresis   CRP, ESR

## 2021-04-26 NOTE — ASSESSMENT & PLAN NOTE
-known diagnosis  -patient should be on metoprolol tartrate 25 mg b i d   Along with Eliquis 5 mg BD  -non compliant as he has run out medication refills and has not yet had a chance to see his provider  -EKG on presentation showing rate controlled AFib    -holding patient's metoprolol tartrate and Eliquis  -placing patient on tele  -follow-up echo

## 2021-04-26 NOTE — ED PROVIDER NOTES
History  Chief Complaint   Patient presents with   [address]     pt states he was tlaking to his daughter and he noticed he was trying to talk to her, but the words were coming out  Time of onset approximately 1    This is a 80-year-old male presents to the emergency department with EMS as a stroke alert  The patient had an incident that started approximately 15 40 of difficulty with word finding and slurred speech  This is associated with a left facial droop  At this point the patient denies facial droop, difficulty with word-finding, chest pain or trouble breathing, weakness in his arms or legs, or numbness  Prior to Admission Medications   Prescriptions Last Dose Informant Patient Reported? Taking? METOPROLOL TARTRATE PO Not Taking at Unknown time  Yes No   Sig: Take 25 mg by mouth 2 (two) times a day   Multiple Vitamins-Minerals (MULTIVITAMIN WITH MINERALS) tablet Not Taking at Unknown time  Yes No   Sig: Take 1 tablet by mouth daily   POTASSIUM CHLORIDE ER PO Not Taking at Unknown time  Yes No   Sig: Take 10 mEq by mouth daily   aspirin 81 mg chewable tablet Not Taking at Unknown time  Yes No   Sig: Chew 81 mg daily   ezetimibe (ZETIA) 10 mg tablet Not Taking at Unknown time  Yes No   Sig: Take 10 mg by mouth daily   furosemide (LASIX) 20 mg tablet Not Taking at Unknown time  Yes No   Sig: Take 20 mg by mouth daily   rosuvastatin (CRESTOR) 5 mg tablet Not Taking at Unknown time  Yes No   Sig: Take 5 mg by mouth daily      Facility-Administered Medications: None       Past Medical History:   Diagnosis Date    Cancer ([facility] [address] )     prostate    COPD (chronic obstructive pulmonary disease) (HCC)     Essential hypertension     Kidney stone        Past Surgical History:   Procedure Laterality Date    CATARACT EXTRACTION      HAND SURGERY      KNEE SURGERY      PROSTATECTOMY         No family history on file  I have reviewed and agree with the history as documented      E-Cigarette/Vaping E-Cigarette/Vaping Substances     Social History     Tobacco Use    Smoking status: Never Smoker    Smokeless tobacco: Never Used   Substance Use Topics    Alcohol use: Yes     Comment: socially     Drug use: No       Review of Systems   All other systems reviewed and are negative        Physical Exam  Physical Exam  Constitutional:  Vital signs reviewed, patient appears nontoxic, no acute distress  Eyes: Pupils equal round reactive to light and accommodation, extraocular muscles intact  HEENT: trachea midline, no JVD, moist mucous membranes  Respiratory: lung sounds clear throughout, no rhonchi, no rales  Cardiovascular: regular rate, irregular rhythm, no murmurs or rubs  Abdomen: soft, nontender, nondistended, no rebound or guarding  Back: no CVA tenderness, normal inspection  Extremities:  Bilateral 2+ lower extremity pitting edema, pulses equal in all 4 extremities  Neuro: awake, alert, oriented, no focal weakness, NIH 0  Skin: warm, dry, intact, no rashes noted    Vital Signs  ED Triage Vitals [04/26/21 1646]   Temperature Pulse Respirations Blood Pressure SpO2   97 9 °F (36 6 °C) 72 16 148/69 98 %      Temp Source Heart Rate Source Patient Position - Orthostatic VS BP Location FiO2 (%)   Oral Monitor Lying Left arm --      Pain Score       No Pain           Vitals:    04/26/21 1646 04/26/21 1655   BP: 148/69 129/87   Pulse: 72 70   Patient Position - Orthostatic VS: Lying          Visual Acuity  Visual Acuity      Most Recent Value   L Pupil Size (mm)  2   R Pupil Size (mm)  2          ED Medications  Medications   aspirin chewable tablet 81 mg (has no administration in time range)   multivitamin-minerals (CENTRUM) tablet 1 tablet (has no administration in time range)   ezetimibe (ZETIA) tablet 10 mg (has no administration in time range)   atorvastatin (LIPITOR) tablet 40 mg (has no administration in time range)   clopidogrel (PLAVIX) tablet 75 mg (has no administration in time range)   iohexol (OMNIPAQUE) 350 MG/ML injection (SINGLE-DOSE) 100 mL (100 mL Intravenous Given 4/26/21 1644)   aspirin (ECOTRIN LOW STRENGTH) EC tablet 324 mg (324 mg Oral Given 4/26/21 1717)   clopidogrel (PLAVIX) tablet 75 mg (75 mg Oral Given 4/26/21 1717)       Diagnostic Studies  Results Reviewed     Procedure Component Value Units Date/Time    Novel Coronavirus (Covid-19),PCR [facility] - 2 hour stat [[id number]]  (Normal) Collected: 04/26/21 1652    Lab Status: Final result Specimen: Nares from Nose Updated: 04/26/21 1752     SARS-CoV-2 Negative    Narrative: The specimen collection materials, transport medium, and/or testing methodology utilized in the production of these test results have been proven to be reliable in a limited validation with an abbreviated program under the Emergency Utilization Authorization provided by the FDA  Testing reported as "Presumptive positive" will be confirmed with secondary testing to ensure result accuracy  Clinical caution and judgement should be used with the interpretation of these results with consideration of the clinical impression and other laboratory testing  Testing reported as "Positive" or "Negative" has been proven to be accurate according to standard laboratory validation requirements  All testing is performed with control materials showing appropriate reactivity at standard intervals        Klever Schneider [[id number]]  (Normal) Collected: 04/26/21 1631    Lab Status: Final result Specimen: Blood from Arm, Right Updated: 04/26/21 1712     Protime 11 0 seconds      INR 0 97    Narrative:      INR Reference Ranges:  No Anticoagulant, Normal:           0 9-1 1  Standard Dose, Oral Anticoagulant:  2 0-3 0  High Dose, Oral Anticoagulant:      2 5-3 5    APTT [[id number]]  (Abnormal) Collected: 04/26/21 1631    Lab Status: Final result Specimen: Blood from Arm, Right Updated: 04/26/21 1712     PTT 32 seconds     Fingerstick Glucose (POCT) [[id number]]  (Normal) Collected: 04/26/21 1633 Lab Status: Final result Updated: 04/26/21 1658     POC Glucose 138 mg/dl     Troponin I [[id number]]  (Normal) Collected: 04/26/21 1631    Lab Status: Final result Specimen: Blood from Arm, Right Updated: 04/26/21 1657     Troponin I 0 07 ng/mL     Basic metabolic panel [[id number]]  (Abnormal) Collected: 04/26/21 1631    Lab Status: Final result Specimen: Blood from Arm, Right Updated: 04/26/21 1654     Sodium 142 mmol/L      Potassium 3 9 mmol/L      Chloride 106 mmol/L      CO2 27 mmol/L      ANION GAP 9 mmol/L      BUN 20 mg/dL      Creatinine 1 11 mg/dL      Glucose 143 mg/dL      Calcium 9 9 mg/dL      eGFR 65 ml/min/1 73sq m     Narrative:      [place] guidelines for Chronic Kidney Disease (CKD):     Stage 1 with normal or high GFR (GFR > 90 mL/min/1 73 square meters)    Stage 2 Mild CKD (GFR = 60-89 mL/min/1 73 square meters)    Stage 3A Moderate CKD (GFR = 45-59 mL/min/1 73 square meters)    Stage 3B Moderate CKD (GFR = 30-44 mL/min/1 73 square meters)    Stage 4 Severe CKD (GFR = 15-29 mL/min/1 73 square meters)    Stage 5 End Stage CKD (GFR <15 mL/min/1 73 square meters)  Note: GFR calculation is accurate only with a steady state creatinine    CBC and Platelet [[id number]]  (Normal) Collected: 04/26/21 1631    Lab Status: Final result Specimen: Blood from Arm, Right Updated: 04/26/21 1639     WBC 6 84 Thousand/uL      RBC 4 59 Million/uL      Hemoglobin 14 3 g/dL      Hematocrit 43 7 %      MCV 95 fL      MCH 31 2 pg      MCHC 32 7 g/dL      RDW 12 9 %      Platelets 924 Thousands/uL      MPV 12 0 fL                  XR chest 1 view portable   ED Interpretation by Simon Morejon DO (04/26 1757)   No pneumonia or pneumothorax      CTA stroke alert (head/neck)   Final Result by Marina Bradshaw MD (04/26 1720)      No evidence of hemodynamic significant stenosis, aneurysm or dissection                    Findings were directly discussed with Jennifer Colvin on 4/26/2021 4:50 PM                      Workstation performed: [id number]         CT stroke alert brain   Final Result by Armand Granados MD (04/26 1647)      No acute intracranial mild abnormality  Findings were directly discussed with Chris Espinal on 4/26/2021 4:44 PM       Workstation performed: [id number]         MRI Inpatient Order    (Results Pending)              Procedures  ECG 12 Lead Documentation Only    Date/Time: 4/26/2021 5:03 PM  Performed by: Jaziel Rivera DO  Authorized by: Jaziel Rivera DO     ECG reviewed by me, the ED Provider: yes    Patient location:  ED  Comments:      AFib, rate of 70, irregular MI, normal QTC, no STEMI, left bundle-branch block             ED Course  ED Course as of Apr 26 1803   Mon Apr 26, 2021 1655 I discussed the patient with Dr Kendall Sharma from neurology  Given that the patients symptoms have resolved, he will not be given TPA  We will restart his asa and give him plavix  PT will be admitted for a Stroke work up  1738 The patient was placed in observation and accepted by Dr Genoveva Bloom  [phone] The patient had a chest x-ray that was interpreted by me that shows no pneumonia or pneumothorax  Stroke Assessment     Row Name 04/26/21 1635 04/26/21 1655          NIH Stroke Scale    Interval  Baseline  --     Level of Consciousness (1a )  0  0     LOC Questions (1b )  0  0     LOC Commands (1c )  0  0     Best Gaze (2 )  0  0     Visual (3 )  0  0     Facial Palsy (4 )  0  0     Motor Arm, Left (5a )  0  0     Motor Arm, Right (5b )  0  0     Motor Leg, Left (6a )  0  0     Motor Leg, Right (6b )  0  0     Limb Ataxia (7 )  0  0     Sensory (8 )  0  0     Best Language (9 )  0  0     Dysarthria (10 )  0  0     Extinction and Inattention (11 ) (Formerly Neglect)  0  0     Total  0  0         First Filed Value   TPA Decision  Patient not a TPA candidate  Patient is not a candidate options  Symptoms resolved/clearly non disabling                    SBIRT 20yo+      Most Recent Value   SBIRT (24 yo +)   In order to provide better care to our patients, we are screening all of our patients for alcohol and drug use  Would it be okay to ask you these screening questions? Unable to answer at this time Filed at: 04/26/2021 1636                    MDM  Number of Diagnoses or Management Options  Stroke [facility]):   TIA (transient ischemic attack):   Diagnosis management comments: This is a 78-year-old male presented to the emergency department complaining of difficulty with word finding and a left-sided facial droop  His symptoms had resolved prior to arrival in the emergency department  I considered a stroke, TIA, electrolyte abnormality  These and other diagnoses were considered  The patient had lab work significant for normal white count normal electrolytes  Patient had a CT and CTA of the head which showed no acute abnormality  The patient had an NIH of 0  Given his improved symptoms the patient was not a candidate for tPA  The patient was placed in observation for further care and evaluation         Amount and/or Complexity of Data Reviewed  Clinical lab tests: reviewed and ordered  Tests in the radiology section of CPT®: reviewed and ordered  Decide to obtain previous medical records or to obtain history from someone other than the patient: yes  Obtain history from someone other than the patient: yes  Review and summarize past medical records: yes  Discuss the patient with other providers: yes  Independent visualization of images, tracings, or specimens: yes        Disposition  Final diagnoses:   Stroke ([address] )   TIA (transient ischemic attack)     Time reflects when diagnosis was documented in both MDM as applicable and the Disposition within this note     Time User Action Codes Description Comment    4/26/2021  4:29 PM Kim Parks [I63 [id number]] Stroke ([address] )     4/26/2021  5:29 PM Fito Perez [[id number]] TIA (transient ischemic attack)     4/26/2021  5:48 PM Junito Contreras [[id number]] TIA (transient ischemic attack)       ED Disposition     ED Disposition Condition Date/Time Comment    Admit Stable Mon Apr 26, 2021  5:29 PM Case was discussed with Rosalba Tom and the patient's admission status was agreed to be Admission Status: observation status to the service of Dr Rosalba Tom   Follow-up Information    None         Patient's Medications   Discharge Prescriptions    No medications on file     No discharge procedures on file      PDMP Review     None          ED Provider  Electronically Signed by           Abby Gooden DO  04/26/21 6564

## 2021-04-26 NOTE — ASSESSMENT & PLAN NOTE
-known diagnosis; patient follows with outpatient Cardiology -[facility] with [facility]  -patient should be on metoprolol tartrate 25 mg b i d  Along with Eliquis 5 mg BD  -non compliant as he has run out medication refills and has not yet had a chance to see his provider  -EKG on presentation showing rate controlled Afib  -echo showing moderately reduced systolic function with an EF of 35-40%  Severe diffuse hypokinesis also present in left ventricle    Wall thickness is moderately increased with moderate concentric hypertrophy     -restarted patient's metoprolol tartrate  -currently holding Eliquis; restart if MRI negative  -placing patient on tele  -follow-up echo

## 2021-04-26 NOTE — ASSESSMENT & PLAN NOTE
Lab Results   Component Value Date    EGFR 65 04/26/2021    EGFR 77 06/13/2018    CREATININE 1 11 04/26/2021    CREATININE 0 98 06/13/2018     -stable  -avoid nephrotoxins and hypotension  -outpatient follow-up with Nephrology at discharge

## 2021-04-26 NOTE — ASSESSMENT & PLAN NOTE
Lab Results   Component Value Date    EGFR 65 04/26/2021    EGFR 77 06/13/2018    CREATININE 1 11 04/26/2021    CREATININE 0 98 06/13/2018     -stable

## 2021-04-26 NOTE — H&P
Vinay U  67   H&P- Riley Lucas 1945, 77 y o  male MRN: [id number]  Unit/Bed#: SHERICE Encounter: [id number]  Primary Care Provider: No primary care provider on file  Date and time admitted to hospital: 4/26/2021  4:27 PM    * TIA (transient ischemic attack)  Assessment & Plan   Presented with aphasia, left-sided facial droop, left upper extremity paresthesias  Last well known time:  Approximately 3:30 p m  Symptoms resolved within approximately 10 to 15 minutes   Risk factors of atrial fibrillation, hypertension, medication noncompliance, pre diabetes   Home medication regimen:  Aspirin 81 mg OD, Zetia 10 mg OD, metoprolol tartrate 25 mg BD, rosuvastatin 10 mg OD, irbesartan 150 mg OD; patient has run out of his medications a few weeks ago has not yet gotten refills   Neuro exam findings:  No focal neurological deficits, A X O X 3  CT head findings:  No acute intracranial abnormality   NIH score: Of 0 at presentation; 0 at time of my evaluation       Neuro checks   MRI brain ordered   [facility]ene Salas Neurology consulted   Echocardiogram with bubble study ordered    Lipid panel:  Ordered, follow-up  HbA1c:  Ordered, follow-up   Continuing on Aspirin 81 mg  Continuing on Zetia 10 mg  Starting on Atorvastatin 40 mg      CKD (chronic kidney disease) stage 2, GFR 60-89 ml/min  Assessment & Plan  Lab Results   Component Value Date    EGFR 65 04/26/2021    EGFR 77 06/13/2018    CREATININE 1 11 04/26/2021    CREATININE 0 98 06/13/2018     -stable  -avoid nephrotoxins and hypotension  -outpatient follow-up with Nephrology at discharge    Essential hypertension  Assessment & Plan  -current blood pressure acceptable  -holding off on home medications of metoprolol tartrate 25 mg BD and irbesartan 150 mg OD for permissive hypertension    Atrial fibrillation ([facility] [address] )  Assessment & Plan  -known diagnosis  -patient should be on metoprolol tartrate 25 mg b i d   Along with Eliquis 5 mg BD  -non compliant as he has run out medication refills and has not yet had a chance to see his provider  -EKG on presentation showing rate controlled AFib    -holding patient's metoprolol tartrate and Eliquis  -placing patient on tele  -follow-up echo    Dyspnea  Assessment & Plan  -patient also reporting dyspnea for the last few weeks, associated with bilateral lower extremity pitting edema extending up to knees  -patient's home medication regimen should include Lasix 20 mg on Mondays Wednesdays and Fridays, however he is noncompliant  -denies any recent weight gains  -no prior echo on chart review    -echo being ordered as part of TIA workup  -holding off on metoprolol tartrate and Lasix as part of permissive hypertension  -can consider cardiology consult based upon echo    VTE Prophylaxis: Heparin  / sequential compression device   Code Status:  Full code  POLST: POLST form is not discussed and not completed at this time  Discussion with family:  Patient's daughter present at bedside    Anticipated Length of Stay:  Patient will be admitted on an Observation basis with an anticipated length of stay of  less than 2 midnights  Justification for Hospital Stay:  TIA, rule out stroke    Total Time for Visit, including Counseling / Coordination of Care: 45 minutes  Greater than 50% of this total time spent on direct patient counseling and coordination of care  Chief Complaint:   Aphasia    History of Present Illness:    Namrata Angeles is a 77 y o  male who presents with PMH-atrial fibrillation, hypertension, hyperlipidemia, pre diabetes  Patient presenting with complaint of aphasia, left-sided facial droop, left-sided upper extremity paresthesias lasting approximately 15 minutes  He reports he was in the middle of a conversation with his daughter, and had a sudden difficulty with finding words  His daughter, who was present at bedside, reports his aphasia was associated with a left-sided facial droop    He denies any headache, noted focal neurological weakness, changes in vision, loss of consciousness, associated chest pain, or palpitations  He does report he has been feeling increasingly dyspneic over the last few weeks, aggravated by exertion  Associated with bilateral lower extremity edema  Denies any recent weight gains  He does report he has run out of his medications a few weeks ago, and has not had a chance to get them refilled  In the ED, stroke alert was called  By the time of presentation, patient's symptoms had completely resolved  Patient had initial NIH score of 0 on presentation, CTA head was done with no acute intracranial pathology  Neurology on-call was consulted:  TPA not indicated for patient as his symptoms have resolved prior to presentation  Neurology also recommending loading with aspirin and Plavix which was done, along with tele monitoring echo and MRI  Patient lab work consisting of  CBC, troponin have thus far been unremarkable  BMP also unremarkable with the exception of EGFR of 65  By the time of my evaluation, patient has not had a reoccurrence of his symptoms  NIH score of 0  Review of Systems:    Review of Systems   Constitutional: Negative for activity change, appetite change, chills and fever  HENT: Negative for congestion, ear pain, rhinorrhea, sinus pain and sore throat  Eyes: Negative for photophobia, pain and visual disturbance  Respiratory: Positive for shortness of breath  Negative for cough and chest tightness  Cardiovascular: Positive for leg swelling  Negative for chest pain and palpitations  Gastrointestinal: Negative for abdominal pain, constipation, diarrhea, nausea and vomiting  Genitourinary: Negative for difficulty urinating, dysuria, flank pain and urgency  Musculoskeletal: Negative for arthralgias, back pain and myalgias  Skin: Negative for rash and wound     Neurological: Positive for facial asymmetry ( left-sided facial droop), speech difficulty and numbness (Left upper extremity, described as tingling)  Negative for dizziness and headaches  Psychiatric/Behavioral: Negative for agitation, behavioral problems and confusion  Past Medical and Surgical History:     Past Medical History:   Diagnosis Date    Cancer [facility])     prostate    COPD [facility]chronic obstructive pulmonary disease) ([facility] [address] )     Essential hypertension     Kidney stone        Past Surgical History:   Procedure Laterality Date    CATARACT EXTRACTION      HAND SURGERY      KNEE SURGERY      PROSTATECTOMY         Meds/Allergies:    Prior to Admission medications    Medication Sig Start Date End Date Taking? Authorizing Provider   aspirin 81 mg chewable tablet Chew 81 mg daily    Historical Provider, MD   ezetimibe (ZETIA) 10 mg tablet Take 10 mg by mouth daily    Historical Provider, MD   furosemide (LASIX) 20 mg tablet Take 20 mg by mouth daily    Historical Provider, MD   METOPROLOL TARTRATE PO Take 25 mg by mouth 2 (two) times a day    Historical Provider, MD   Multiple Vitamins-Minerals (MULTIVITAMIN WITH MINERALS) tablet Take 1 tablet by mouth daily    Historical Provider, MD   POTASSIUM CHLORIDE ER PO Take 10 mEq by mouth daily    Historical Provider, MD   rosuvastatin (CRESTOR) 5 mg tablet Take 5 mg by mouth daily    Historical Provider, MD     I have reviewed home medications with patient personally  Allergies: No Known Allergies    Social History:     Marital Status:     Patient Pre-hospital Living Situation:  Home  Patient Pre-hospital Level of Mobility:  Independent  Patient Pre-hospital Diet Restrictions:  Regular  Substance Use History:   Social History     Substance and Sexual Activity   Alcohol Use Yes    Comment: socially      Social History     Tobacco Use   Smoking Status Never Smoker   Smokeless Tobacco Never Used     Social History     Substance and Sexual Activity   Drug Use No       Family History:    non-contributory    Physical Exam:     Vitals:   Blood Pressure: 129/87 (04/26/21 1655)  Pulse: 70 (04/26/21 1655)  Temperature: 97 9 °F (36 6 °C) (04/26/21 1646)  Temp Source: Oral (04/26/21 1646)  Respirations: 16 (04/26/21 1655)  Weight - Scale: (!) 145 kg (320 lb 1 7 oz) (04/26/21 1645)  SpO2: 97 % (04/26/21 1655)    Physical Exam  Vitals signs reviewed  Constitutional:       General: He is not in acute distress  Appearance: Normal appearance  He is obese  HENT:      Head: Normocephalic and atraumatic  Nose: Nose normal    Eyes:      General: No scleral icterus  Right eye: No discharge  Left eye: No discharge  Extraocular Movements: Extraocular movements intact  Neck:      Musculoskeletal: Normal range of motion  Cardiovascular:      Rate and Rhythm: Normal rate  Rhythm irregular  Pulses: Normal pulses  Pulmonary:      Effort: Pulmonary effort is normal  No respiratory distress  Breath sounds: Normal breath sounds  Abdominal:      General: Bowel sounds are normal  There is no distension  Palpations: Abdomen is soft  Tenderness: There is no abdominal tenderness  Musculoskeletal: Normal range of motion  Right lower leg: Edema present  Left lower leg: Edema present  Skin:     General: Skin is warm and dry  Neurological:      General: No focal deficit present  Mental Status: He is alert and oriented to person, place, and time  Comments: II: Pupils equal, round, reactive to light with normal accomodation  III,IV,VI: extraocular muscles intact, no nystagmus   VII: Face is symmetric with no weakness noted  IX/X: Uvula midline   Soft palate elevates symmetrically  XII: Tongue midline with no atrophy or fasciculations and with appropriate movement  Motor:  5/5 in bilateral upper and lower extremities  no noted sensory defects in bilateral upper or lower extremity   Psychiatric:         Mood and Affect: Mood normal          Behavior: Behavior normal              Additional Data:     Lab Results: I have personally reviewed pertinent reports  Results from last 7 days   Lab Units 04/26/21  1631   WBC Thousand/uL 6 84   HEMOGLOBIN g/dL 14 3   HEMATOCRIT % 43 7   PLATELETS Thousands/uL 225     Results from last 7 days   Lab Units 04/26/21  1631   SODIUM mmol/L 142   POTASSIUM mmol/L 3 9   CHLORIDE mmol/L 106   CO2 mmol/L 27   BUN mg/dL 20   CREATININE mg/dL 1 11   ANION GAP mmol/L 9   CALCIUM mg/dL 9 9   GLUCOSE RANDOM mg/dL 143*     Results from last 7 days   Lab Units 04/26/21  1631   INR  0 97     Results from last 7 days   Lab Units 04/26/21  1633   POC GLUCOSE mg/dl 138               Imaging: I have personally reviewed pertinent reports  XR chest 1 view portable   ED Interpretation by Brennon Khan DO (04/26 1757)   No pneumonia or pneumothorax      CTA stroke alert (head/neck)   Final Result by Billy Mcallister MD (04/26 1720)      No evidence of hemodynamic significant stenosis, aneurysm or dissection  Findings were directly discussed with Ramy Carpenter on 4/26/2021 4:50 PM                      Workstation performed: [id number]         CT stroke alert brain   Final Result by Billy Mcallister MD (04/26 1647)      No acute intracranial mild abnormality  Findings were directly discussed with Ramy Carpenter on 4/26/2021 4:44 PM       Workstation performed: [id number]         MRI Inpatient Order    (Results Pending)       EKG, Pathology, and Other Studies Reviewed on Admission:   · EKG:  Rate controlled AFib; no acute ST-T-wave changes    Allscripts / Epic Records Reviewed: Yes     ** Please Note: This note has been constructed using a voice recognition system   **

## 2021-04-26 NOTE — ASSESSMENT & PLAN NOTE
 Presented with aphasia, left-sided facial droop, left upper extremity paresthesias  Last well known time:  Approximately 3:30 p m   Risk factors of atrial fibrillation, hypertension, medication noncompliance, pre diabetes   Home medication regimen:  Aspirin 81 mg OD, Zetia 10 mg OD, metoprolol tartrate 25 mg BD, rosuvastatin 10 mg OD, irbesartan 150 mg OD; patient has run out of his medications a few weeks ago has not yet gotten refills   Neuro exam findings:  No focal neurological deficits, A X O X 3  CT head findings:  No acute intracranial abnormality   NIH score: Of 0 at presentation; 0 at time of my evaluation       Neuro checks   MRI brain ordered   Edith Frazier Neurology consulted   Echocardiogram with bubble study ordered    Lipid panel:  Ordered, follow-up  HbA1c:  Ordered, follow-up   Continuing on Aspirin 81 mg  Continuing on Zetia 10 mg   Starting on Atorvastatin 40 mg

## 2021-04-27 ENCOUNTER — APPOINTMENT (OUTPATIENT)
Dept: NON INVASIVE DIAGNOSTICS | Facility: HOSPITAL | Age: 76
End: 2021-04-27
Payer: COMMERCIAL

## 2021-04-27 ENCOUNTER — APPOINTMENT (OUTPATIENT)
Dept: CT IMAGING | Facility: HOSPITAL | Age: 76
End: 2021-04-27
Payer: COMMERCIAL

## 2021-04-27 PROBLEM — I50.20 SYSTOLIC CHF (HCC): Status: ACTIVE | Noted: 2021-04-26

## 2021-04-27 LAB
ATRIAL RATE: 0 BPM
CHOLEST SERPL-MCNC: 117 MG/DL
EST. AVERAGE GLUCOSE BLD GHB EST-MCNC: 143 MG/DL
GLUCOSE SERPL-MCNC: 107 MG/DL (ref 65–140)
GLUCOSE SERPL-MCNC: 116 MG/DL (ref 65–140)
HBA1C MFR BLD: 6.6 %
HDLC SERPL-MCNC: 34 MG/DL
LDLC SERPL CALC-MCNC: 57 MG/DL (ref 0–100)
PR INTERVAL: 180 MS
QRS AXIS: -38 DEGREES
QRSD INTERVAL: 200 MS
QT INTERVAL: 485 MS
QTC INTERVAL: 524 MS
T WAVE AXIS: 93 DEGREES
TRIGL SERPL-MCNC: 129.4 MG/DL
VENTRICULAR RATE: 70 BPM

## 2021-04-27 PROCEDURE — 99226 PR SBSQ OBSERVATION CARE/DAY 35 MINUTES: CPT | Performed by: INTERNAL MEDICINE

## 2021-04-27 PROCEDURE — 93306 TTE W/DOPPLER COMPLETE: CPT

## 2021-04-27 PROCEDURE — 99204 OFFICE O/P NEW MOD 45 MIN: CPT | Performed by: PSYCHIATRY & NEUROLOGY

## 2021-04-27 PROCEDURE — 93010 ELECTROCARDIOGRAM REPORT: CPT | Performed by: INTERNAL MEDICINE

## 2021-04-27 PROCEDURE — 80061 LIPID PANEL: CPT | Performed by: INTERNAL MEDICINE

## 2021-04-27 PROCEDURE — G1004 CDSM NDSC: HCPCS

## 2021-04-27 PROCEDURE — 97163 PT EVAL HIGH COMPLEX 45 MIN: CPT

## 2021-04-27 PROCEDURE — 93306 TTE W/DOPPLER COMPLETE: CPT | Performed by: INTERNAL MEDICINE

## 2021-04-27 PROCEDURE — 83036 HEMOGLOBIN GLYCOSYLATED A1C: CPT | Performed by: INTERNAL MEDICINE

## 2021-04-27 PROCEDURE — 82948 REAGENT STRIP/BLOOD GLUCOSE: CPT

## 2021-04-27 PROCEDURE — 70450 CT HEAD/BRAIN W/O DYE: CPT

## 2021-04-27 RX ORDER — FUROSEMIDE 20 MG/1
20 TABLET ORAL DAILY
Status: DISCONTINUED | OUTPATIENT
Start: 2021-04-27 | End: 2021-04-27

## 2021-04-27 RX ORDER — FUROSEMIDE 20 MG/1
20 TABLET ORAL EVERY OTHER DAY
Status: DISCONTINUED | OUTPATIENT
Start: 2021-04-27 | End: 2021-04-28 | Stop reason: HOSPADM

## 2021-04-27 RX ADMIN — EZETIMIBE 10 MG: 10 TABLET ORAL at 08:44

## 2021-04-27 RX ADMIN — METOPROLOL TARTRATE 25 MG: 25 TABLET, FILM COATED ORAL at 22:10

## 2021-04-27 RX ADMIN — HEPARIN SODIUM 5000 UNITS: 5000 INJECTION INTRAVENOUS; SUBCUTANEOUS at 22:10

## 2021-04-27 RX ADMIN — FUROSEMIDE 20 MG: 20 TABLET ORAL at 15:24

## 2021-04-27 RX ADMIN — ATORVASTATIN CALCIUM 40 MG: 40 TABLET, FILM COATED ORAL at 17:19

## 2021-04-27 RX ADMIN — CLOPIDOGREL BISULFATE 75 MG: 75 TABLET ORAL at 08:39

## 2021-04-27 RX ADMIN — ASPIRIN 81 MG: 81 TABLET, CHEWABLE ORAL at 09:33

## 2021-04-27 RX ADMIN — METOPROLOL TARTRATE 25 MG: 25 TABLET, FILM COATED ORAL at 15:24

## 2021-04-27 RX ADMIN — HEPARIN SODIUM 5000 UNITS: 5000 INJECTION INTRAVENOUS; SUBCUTANEOUS at 15:23

## 2021-04-27 RX ADMIN — HEPARIN SODIUM 5000 UNITS: 5000 INJECTION INTRAVENOUS; SUBCUTANEOUS at 05:41

## 2021-04-27 RX ADMIN — Medication 1 TABLET: at 08:39

## 2021-04-27 NOTE — CASE MANAGEMENT
Per rounding, patient requires MRI while inpatient due to TIA work-up  CM faxed MRI order to [facility]  CM s/w Cj at [facility] regarding scheduling patient, patient scheduled for 2030 with request to arrive at 2000  Cj confirmed faxed order received  CM sent transport request to [facility]  CM to continue following

## 2021-04-27 NOTE — CONSULTS
TeleConsultation - Neurology   Ethan Mendoza 77 y o  male MRN: [id number]  Unit/Bed#: -01 Encounter: [id number]      REQUIRED DOCUMENTATION:     1  This service was provided via Telemedicine  2  Provider located at [place], [place]  3  TeleMed provider: Yue New MD   4  Identify all parties in room with patient during tele consult:  Patient alone  5  After connecting through televideo, patient was identified by name and date of birth and assistant checked wristband  Patient was then informed that this was a Telemedicine visit and that the exam was being conducted confidentially over secure lines  My office door was closed  No one else was in the room  Patient acknowledged consent and understanding of privacy and security of the Telemedicine visit, and gave us permission to have the assistant stay in the room in order to assist with the history and to conduct the exam   I informed the patient that I have reviewed their record in Epic and presented the opportunity for them to ask any questions regarding the visit today  The patient agreed to participate  Assessment/Plan     No new Assessment & Plan notes have been filed under this hospital service since the last note was generated  Service: Neurology    77year old man with afib not currently on AC, HTN, prostate CA, CKD2, depression, presenting with dysarthria and facial weakness on L, resolved within 20 minutes  No deficits on exam today  Imaging thus far unremarkable  May have been a TIA, versus a stroke with no residual symptoms  -now on dual antiplatelet therapy, with plan to restart his eliquis if MRI does not show a hemorrhage  -continue on lipitor  -permissive HTN, with gradual lowering of BP  -for peripheral neuropathy, would check TSH, B12, B1, vitamin D, RPR, and SPEP  -neurology to follow results    Recommendations for outpatient neurological follow up have yet to be determined      History of Present Illness     Reason for Consult / Principal Problem: Slurred speech and facial asymmetry  Hx and PE limited by: none  HPI: Ewelina Clancy is a 77 y o  right handed male with afib, not on AC, HTN, prostate CA in 2018 s/p prostatectomy, CKD2 who presents with transient slurred speech, and facial weakness  He was with his daughter yesterday afternoon, had been moving things around in the yard, and resting for a while  They got up to go inside, and he noted to have sudden onset of slurred speech, and drooping of the face on the L  Patient stated he knew what he wanted to say, and tried to speak, but his words came out slurred, and she was unable to understand him  He was able to understand her completely  He denies any other neurological complaints at the time  Neuro review of systems negative for headache, visual disturbance, arm or leg weakness, gait imbalance, discoordination, or loss of consciousness/awareness  He does endorse chronic symmetric lower extremity numbness that is constant, and also has L hand carpel tunnel syndrome resulting in L hand numbness, but these were unchanged yesterday  Medically, he endorses having cramping in both his legs when walking longer distances, and intermittent shortness of breath on exertion for last several months  Otherwise, he denies having chest pain, abdominal pain, fevers, chills, cough, weight loss, diarrhea, or dysuria  He does note that he stopped taking all of his medications about 1 month ago  He ran out of medications, and states that he did not have any refills, and was supposed to follow up with his PMD this week to reestablish care and his medication regiment  The medications stopped include his anticoagulation, antihypertensives, and antidepressant medications  He currently lives alone () in [place] and is visiting his daughter who lives in [facility]  Former smoker, 3ppd from age 16 to about 40  Denies ETOH and illicit substance use            Inpatient consult to Neurology  Consult performed by: Kaila Cruz MD  Consult ordered by: Corbin Hardwick MD           Review of Systems    Historical Information   Past Medical History:   Diagnosis Date    Cancer [facility])     prostate    COPD [facility]chronic obstructive pulmonary disease) ([facility] [address] )     Essential hypertension     Kidney stone      Past Surgical History:   Procedure Laterality Date    CATARACT EXTRACTION      HAND SURGERY      KNEE SURGERY      PROSTATECTOMY       Social History   Social History     Substance and Sexual Activity   Alcohol Use Yes    Comment: socially      Social History     Substance and Sexual Activity   Drug Use No     E-Cigarette/Vaping     E-Cigarette/Vaping Substances     Social History     Tobacco Use   Smoking Status Never Smoker   Smokeless Tobacco Never Used     Family History: No family history on file  Meds/Allergies   current meds:   Current Facility-Administered Medications   Medication Dose Route Frequency    aspirin chewable tablet 81 mg  81 mg Oral Daily    atorvastatin (LIPITOR) tablet 40 mg  40 mg Oral QPM    clopidogrel (PLAVIX) tablet 75 mg  75 mg Oral Daily    ezetimibe (ZETIA) tablet 10 mg  10 mg Oral Daily    heparin (porcine) subcutaneous injection 5,000 Units  5,000 Units Subcutaneous Q8H [facility]    multivitamin-minerals (CENTRUM) tablet 1 tablet  1 tablet Oral Daily    and PTA meds:   Prior to Admission Medications   Prescriptions Last Dose Informant Patient Reported? Taking?    METOPROLOL TARTRATE PO Not Taking at Unknown time  Yes No   Sig: Take 25 mg by mouth 2 (two) times a day   Multiple Vitamins-Minerals (MULTIVITAMIN WITH MINERALS) tablet Not Taking at Unknown time  Yes No   Sig: Take 1 tablet by mouth daily   POTASSIUM CHLORIDE ER PO Not Taking at Unknown time  Yes No   Sig: Take 10 mEq by mouth daily   aspirin 81 mg chewable tablet Not Taking at Unknown time  Yes No   Sig: Chew 81 mg daily   ezetimibe (ZETIA) 10 mg tablet Not Taking at Unknown time  Yes No Sig: Take 10 mg by mouth daily   furosemide (LASIX) 20 mg tablet Not Taking at Unknown time  Yes No   Sig: Take 20 mg by mouth daily   rosuvastatin (CRESTOR) 5 mg tablet Not Taking at Unknown time  Yes No   Sig: Take 5 mg by mouth daily      Facility-Administered Medications: None       No Known Allergies    Objective   Vitals:Blood pressure 124/79, pulse 70, temperature (!) 97 1 °F (36 2 °C), temperature source Tympanic, resp  rate 18, height 6' 2" (1 88 m), weight (!) 140 kg (308 lb 12 8 oz), SpO2 90 %  ,Body mass index is 39 65 kg/m²  No intake or output data in the 24 hours ending 04/27/21 1150    Invasive Devices: Invasive Devices     Peripheral Intravenous Line            Peripheral IV 04/26/21 Right Antecubital less than 1 day                Physical Exam  Neurologic Exam  Awake, alert, following commands, oriented, giving a comprehensive history, normal naming, repetition, and command following with crossing midlline, and no L/R confusion  Pupils 3-2mm, symmetric, full VF's, normal EOM with no nystagmus, no facial weakness, no dysarthria, tongue midline  MOTOR: normal bulk and tone, no drift throughout  COORDINATION: normal FNF  GAIT: narrow based  Lab Results:   CBC:   Results from last 7 days   Lab Units 04/26/21  1631   WBC Thousand/uL 6 84   RBC Million/uL 4 59   HEMOGLOBIN g/dL 14 3   HEMATOCRIT % 43 7   MCV fL 95   PLATELETS Thousands/uL 225   , BMP/CMP:   Results from last 7 days   Lab Units 04/26/21  1631   SODIUM mmol/L 142   POTASSIUM mmol/L 3 9   CHLORIDE mmol/L 106   CO2 mmol/L 27   BUN mg/dL 20   CREATININE mg/dL 1 11   CALCIUM mg/dL 9 9   EGFR ml/min/1 73sq m 65     Imaging Studies: I have personally reviewed pertinent films in PACS     [facility] images reviewed: no hemorrhage    No evidence of acute intracranial pathology

## 2021-04-27 NOTE — SPEECH THERAPY NOTE
Speech Language/Pathology  Speech/Language Pathology  Assessment    Patient Name: Diane Cai  GHTIL'O Date: 4/27/2021     Problem List  Principal Problem:    TIA (transient ischemic attack)  Active Problems:    Dyspnea    Atrial fibrillation ([address] )    Essential hypertension    CKD (chronic kidney disease) stage 2, GFR 60-89 ml/min    Past Medical History  Past Medical History:   Diagnosis Date    Cancer [facility])     prostate    COPD (chronic obstructive pulmonary disease) ([facility] [address] )     Essential hypertension     Kidney stone      Past Surgical History  Past Surgical History:   Procedure Laterality Date    CATARACT EXTRACTION      HAND SURGERY      KNEE SURGERY      PROSTATECTOMY       Diane Cai is a 77 y o  male who presents with PMH-atrial fibrillation, hypertension, hyperlipidemia, pre diabetes  Patient presenting with complaint of aphasia, left-sided facial droop, left-sided upper extremity paresthesias lasting approximately 15 minutes  He reports he was in the middle of a conversation with his daughter, and had a sudden difficulty with finding words  His daughter, who was present at bedside, reports his aphasia was associated with a left-sided facial droop  He denies any headache, noted focal neurological weakness, changes in vision, loss of consciousness, associated chest pain, or palpitations  Consult received for speech/swallow eval on stroke pathway  Pt passed nsg swallow screen; tolerating regular diet w/o s/s dysphagia or aspiration  No speech/language deficits reported  NIH score is 0  MRI: pending     No need for formal speech/swallow eval at this time  Reconsult if needed      Cydney Carter CCC-SLP  Speech Pathologist  Available via  tiger text

## 2021-04-27 NOTE — CASE MANAGEMENT
MARGRET notified that patient is unable to go to [facility] for MRI this evening due having a pacemaker  Cj at [facility] aware  RN s/w [employer], [employer] requesting patient be transferred to [facility] as they will be onsite for other patient's and would be able to see patient for MRI at that time  CM attempted phone call to Michael Schuster, Radiology Manager at [facility] at [phone] - no answer  CM made call to Radiology  at [phone] and s/w Joey Sears stating that there is no availability to see patient tomorrow at [facility] and that request would need to go through [employer]  MARGRET explained that this would not be an OP order, patient would come with EMS crew for imaging and return to [facility] upon completion  Joey Sears requested this CM call central scheduling  CM attempted another phone call to Michael Schuster at [phone]  MARGRET s/w Michael Schuster and explained situation  Michael Schuster stating that schedule is already full however will look for possibility and call this CM back

## 2021-04-27 NOTE — PLAN OF CARE
Problem: Potential for Falls  Goal: Patient will remain free of falls  Description: INTERVENTIONS:  - Assess patient frequently for physical needs  -  Identify cognitive and physical deficits and behaviors that affect risk of falls    -  [place] fall precautions as indicated by assessment   - Educate patient/family on patient safety including physical limitations  - Instruct patient to call for assistance with activity based on assessment  - Modify environment to reduce risk of injury  - Consider OT/PT consult to assist with strengthening/mobility  Outcome: Progressing     Problem: PAIN - ADULT  Goal: Verbalizes/displays adequate comfort level or baseline comfort level  Description: Interventions:  - Encourage patient to monitor pain and request assistance  - Assess pain using appropriate pain scale  - Administer analgesics based on type and severity of pain and evaluate response  - Implement non-pharmacological measures as appropriate and evaluate response  - Consider cultural and social influences on pain and pain management  - Notify physician/advanced practitioner if interventions unsuccessful or patient reports new pain  Outcome: Progressing     Problem: INFECTION - ADULT  Goal: Absence or prevention of progression during hospitalization  Description: INTERVENTIONS:  - Assess and monitor for signs and symptoms of infection  - Monitor lab/diagnostic results  - Monitor all insertion sites, i e  indwelling lines, tubes, and drains  - Monitor endotracheal if appropriate and nasal secretions for changes in amount and color  - [place] appropriate cooling/warming therapies per order  - Administer medications as ordered  - Instruct and encourage patient and family to use good hand hygiene technique  - Identify and instruct in appropriate isolation precautions for identified infection/condition  Outcome: Progressing     Problem: SAFETY ADULT  Goal: Patient will remain free of falls  Description: INTERVENTIONS:  - Assess patient frequently for physical needs  -  Identify cognitive and physical deficits and behaviors that affect risk of falls    -  [place] fall precautions as indicated by assessment   - Educate patient/family on patient safety including physical limitations  - Instruct patient to call for assistance with activity based on assessment  - Modify environment to reduce risk of injury  - Consider OT/PT consult to assist with strengthening/mobility  Outcome: Progressing  Goal: Maintain or return to baseline ADL function  Description: INTERVENTIONS:  -  Assess patient's ability to carry out ADLs; assess patient's baseline for ADL function and identify physical deficits which impact ability to perform ADLs (bathing, care of mouth/teeth, toileting, grooming, dressing, etc )  - Assess/evaluate cause of self-care deficits   - Assess range of motion  - Assess patient's mobility; develop plan if impaired  - Assess patient's need for assistive devices and provide as appropriate  - Encourage maximum independence but intervene and supervise when necessary  - Involve family in performance of ADLs  - Assess for home care needs following discharge   - Consider OT consult to assist with ADL evaluation and planning for discharge  - Provide patient education as appropriate  Outcome: Progressing     Problem: DISCHARGE PLANNING  Goal: Discharge to home or other facility with appropriate resources  Description: INTERVENTIONS:  - Identify barriers to discharge w/patient and caregiver  - Arrange for needed discharge resources and transportation as appropriate  - Identify discharge learning needs (meds, wound care, etc )  - Arrange for interpretive services to assist at discharge as needed  - Refer to Case Management Department for coordinating discharge planning if the patient needs post-hospital services based on physician/advanced practitioner order or complex needs related to functional status, cognitive ability, or social support system  Outcome: Progressing     Problem: Knowledge Deficit  Goal: Patient/family/caregiver demonstrates understanding of disease process, treatment plan, medications, and discharge instructions  Description: Complete learning assessment and assess knowledge base  Interventions:  - Provide teaching at level of understanding  - Provide teaching via preferred learning methods  Outcome: Progressing     Problem: Neurological Deficit  Goal: Neurological status is stable or improving  Description: Interventions:  - Monitor and assess patient's level of consciousness, motor function, sensory function, and level of assistance needed for ADLs  - Monitor and report changes from baseline  Collaborate with interdisciplinary team to initiate plan and implement interventions as ordered  - Provide and maintain a safe environment  - Consider seizure precautions  - Consider fall precautions  - Consider aspiration precautions  - Consider bleeding precautions  Outcome: Progressing     Problem: Activity Intolerance/Impaired Mobility  Goal: Mobility/activity is maintained at optimum level for patient  Description: Interventions:  - Assess and monitor patient  barriers to mobility and need for assistive/adaptive devices  - Assess patient's emotional response to limitations  - Collaborate with interdisciplinary team and initiate plans and interventions as ordered  - Encourage independent activity per ability   - Maintain proper body alignment  - Perform active/passive rom as tolerated/ordered  - Plan activities to conserve energy   - Turn patient as appropriate  Outcome: Progressing     Problem: Communication Impairment  Goal: Ability to express needs and understand communication  Description: Assess patient's communication skills and ability to understand information  Patient will demonstrate use of effective communication techniques, alternative methods of communication and understanding even if not able to speak       - Encourage communication and provide alternate methods of communication as needed  - Collaborate with case management/ for discharge needs  - Include patient/family/caregiver in decisions related to communication    Outcome: Progressing

## 2021-04-27 NOTE — CASE MANAGEMENT
Per MICHELLE, transport to arrive to pick-up patient at 1930 and will stay with patient until return  RN made aware  CMN in chart for transport

## 2021-04-27 NOTE — PROGRESS NOTES
Vinay U  67   Progress Note - Ethan Mendoza 1945, 77 y o  male MRN: [id number]  Unit/Bed#: -[facility]an[facility] Encounter: [id number]  Primary Care Provider: No primary care provider on file  Date and time admitted to hospital: 4/26/2021  4:27 PM    * TIA (transient ischemic attack)  Assessment & Plan   Presented with aphasia, left-sided facial droop, left upper extremity paresthesias  Last well known time:  Approximately 3:30 p m  Symptoms resolved within approximately 10 to 15 minutes   Risk factors of atrial fibrillation, hypertension, medication noncompliance, pre diabetes   Home medication regimen:  Aspirin 81 mg OD, Zetia 10 mg OD, metoprolol tartrate 25 mg BD, rosuvastatin 10 mg OD, irbesartan 150 mg OD; patient has run out of his medications a few weeks ago has not yet gotten refills   Neuro exam findings:  No focal neurological deficits, A X O X 3  CT head findings:  No acute intracranial abnormality   NIH score: Of 0 at presentation; 0 at time of my evaluation   Neurology consulted:  Recommending continuation on Lipitor, okay to restart Eliquis of MRI does not show however   HbA1c:  6 6   Lipid panel:  Non impressive     Neuro checks   MRI brain ordered; will likely get done around 7:30 p m  Today   Continuing on Aspirin 81 mg  Continuing on Zetia 10 mg   Starting on Atorvastatin 40 mg-   follow-up TSH   follow-up B1, B12, vitamin-D   follow-up protein electrophoresis   CRP, ESR    CKD (chronic kidney disease) stage 2, GFR 60-89 ml/min  Assessment & Plan  Lab Results   Component Value Date    EGFR 65 04/26/2021    EGFR 77 06/13/2018    CREATININE 1 11 04/26/2021    CREATININE 0 98 06/13/2018     -stable  -avoid nephrotoxins and hypotension  -outpatient follow-up with Nephrology at discharge    Essential hypertension  Assessment & Plan  -current blood pressure acceptable  -holding off on home medications of metoprolol tartrate 25 mg BD and irbesartan 150 mg OD for permissive hypertension    Atrial fibrillation [facility])  Assessment & Plan  -known diagnosis; patient follows with outpatient Cardiology -Miriam Singh affiliated with [facility]  -patient should be on metoprolol tartrate 25 mg b i d  Along with Eliquis 5 mg BD  -non compliant as he has run out medication refills and has not yet had a chance to see his provider  -EKG on presentation showing rate controlled Afib  -echo showing moderately reduced systolic function with an EF of 35-40%  Severe diffuse hypokinesis also present in left ventricle  Wall thickness is moderately increased with moderate concentric hypertrophy     -restarted patient's metoprolol tartrate  -currently holding Eliquis; restart if MRI negative  -placing patient on tele  -follow-up echo    Systolic CHF [facility])  Assessment & Plan  -not an accurate historian, is not aware if he has heart failure, does follow with outpatient cardiology, reports he has had multiple echoes done in the past  -echo reports not available on chart review; inpatient echo done   -inpatient echo: Moderately reduced systolic function with  EF of 35-40%, severe diffuse hypokinesis with distinct regional wall motion abnormalities    Wall thickness moderately increased with moderate concentric hypertrophy  -patient presented with dyspnea for the last few weeks, associated with bilateral lower extremity pitting edema extending up to knees  -patient's home medication regimen should include Lasix 20 mg on Mondays Wednesdays and Fridays, however he is noncompliant  -denies any recent weight gains  -no prior echo on chart review      -restarting metoprolol tartrate and Lasix at home dosages        VTE Pharmacologic Prophylaxis:   Pharmacologic: Heparin  Mechanical VTE Prophylaxis in Place: Yes    Discussions with Specialists or Other Care Team Provider:  Neurology    Education and Discussions with Family / Patient:  Patient, Phone call with patients daughter attempted-unable to be reached  Left a voice mail  Current Length of Stay: 0 day(s)    Current Patient Status: Observation     Discharge Plan / Estimated Discharge Date:  Pending, dependent on MRI scan    Code Status: Level 1 - Full Code      Subjective:   Patient was seen and examined by me at bedside  Communicated clearly  No particular overnight event reported  Hemodynamically stable and afebrile  Patient denies any recurrence of his symptoms  NIH score 0  Objective:     Vitals:   Temp (24hrs), Av 7 °F (36 5 °C), Min:97 1 °F (36 2 °C), Max:98 1 °F (36 7 °C)    Temp:  [97 1 °F (36 2 °C)-98 1 °F (36 7 °C)] 97 1 °F (36 2 °C)  HR:  [57-72] 70  Resp:  [16-20] 18  BP: ()/(59-87) 124/79  SpO2:  [90 %-100 %] 90 %  Body mass index is 39 65 kg/m²  Input and Output Summary (last 24 hours):     No intake or output data in the 24 hours ending 21 1348    Physical Exam:     Physical Exam  Vitals signs reviewed  Constitutional:       Appearance: He is obese  HENT:      Head: Normocephalic and atraumatic  Nose: Nose normal    Eyes:      General: No scleral icterus  Right eye: No discharge  Left eye: No discharge  Extraocular Movements: Extraocular movements intact  Neck:      Musculoskeletal: Normal range of motion  Cardiovascular:      Rate and Rhythm: Normal rate and regular rhythm  Pulses: Normal pulses  Pulmonary:      Effort: Pulmonary effort is normal  No respiratory distress  Breath sounds: Normal breath sounds  Abdominal:      General: Bowel sounds are normal  There is no distension  Palpations: Abdomen is soft  Tenderness: There is no abdominal tenderness  Musculoskeletal: Normal range of motion  Skin:     General: Skin is warm and dry  Neurological:      General: No focal deficit present  Mental Status: He is alert and oriented to person, place, and time     Psychiatric:         Mood and Affect: Mood normal          Behavior: Behavior normal            Additional Data:     Labs:    Results from last 7 days   Lab Units 04/26/21  1631   WBC Thousand/uL 6 84   HEMOGLOBIN g/dL 14 3   HEMATOCRIT % 43 7   PLATELETS Thousands/uL 225     Results from last 7 days   Lab Units 04/26/21  1631   POTASSIUM mmol/L 3 9   CHLORIDE mmol/L 106   CO2 mmol/L 27   BUN mg/dL 20   CREATININE mg/dL 1 11   CALCIUM mg/dL 9 9     Results from last 7 days   Lab Units 04/26/21  1631   INR  0 97       * I Have Reviewed All Lab Data Listed Above  * Additional Pertinent Lab Tests Reviewed: All Labs Within Last 24 Hours Reviewed    Imaging:    Imaging Reports Reviewed Today Include:  Echo  Imaging Personally Reviewed by Myself Includes:  None    Recent Cultures (last 7 days):           Last 24 Hours Medication List:   Current Facility-Administered Medications   Medication Dose Route Frequency Provider Last Rate    aspirin  81 mg Oral Daily Quincy Herrera MD      atorvastatin  40 mg Oral QPM Quincy Herrera MD      clopidogrel  75 mg Oral Daily Quincy Herrera MD      ezetimibe  10 mg Oral Daily Quincy Herrera MD      furosemide  20 mg Oral Every Other Day Loki Peters MD      heparin (porcine)  5,000 Units Subcutaneous Q8H [facility] Loki Peters MD      metoprolol tartrate  25 mg Oral Q12H [facility] Loki Peters MD      multivitamin-minerals  1 tablet Oral Daily Quincy Herrera MD          Today, Patient Was Seen By: Loki Peters MD    ** Please Note: This note has been constructed using a voice recognition system   **

## 2021-04-27 NOTE — CASE MANAGEMENT
No confirmed availability for MRI at this time due to pacemaker  Transport cancelled  CM to continue to follow  Per rounding with PT, no recs for VNA or STR at this time

## 2021-04-27 NOTE — PHYSICAL THERAPY NOTE
PHYSICAL THERAPY EVALUATION    NAME:  Bala Cruz  DATE: 04/27/21    AGE:   77 y o  Mrn:   [id number]  Length Of Stay: 0    ADMIT DX:  TIA (transient ischemic attack) [G45 9]  [facility] ([facility] ) [I63 9]    Past Medical History:   Diagnosis Date    Cancer [facility])     prostate    COPD [facility]ch[facility] obstructive pulmonary disease) [facility]     Essential hypertension     Kidney stone      Past Surgical History:   Procedure Laterality Date    CATARACT EXTRACTION      HAND SURGERY      KNEE SURGERY      PROSTATECTOMY         Performed at least 2 patient identifiers during session: Name, Lincoln Massed and ID bracelet       04/27/21 1017   PT Last Visit   PT Visit Date 04/27/21   Note Type   Note type Evaluation   Pain Assessment   Pain Assessment Tool 0-10   Pain Score No Pain   Home Living   Type of [address] [place] [address] Multi-level;Bed/bath upstairs;Stairs to enter with rails  (3 Stories; 4+3 DANI; 15 steps to basement and second floor)   Bathroom Shower/Tub Tub/shower unit  (lower tub installed to be able to clear step over)   [employer] Raised  (raised in 2nd floor bathroom)   Bathroom Equipment Hand-held shower   Prior Function   Level of [place] Independent with ADLs and functional mobility  (D/t pandemic, pt has assist with grocery shopping)   Lives With [address] [place] [address] in the last 6 months 1 to 4   Vocational Retired   Comments Pt reports fall about 1 month ago while trying to  heavy box filled with online food order  Pt reports falling back and hitting head  Denies LOC, no medical follow up  Resulting in bruised R ribs and R shoulder   Restrictions/Precautions   Weight Bearing Precautions Per Order No   Other Precautions Telemetry   General   Additional Pertinent History Pt admitted 4/26/21 due to TIA  + L facial droop, L UE N/T, slurring speech, difficulty word finding      Family/Caregiver Present No   Cognition   Overall Cognitive Status WFL   Arousal/Participation Alert   Orientation Level Oriented X4   Memory Within functional limits   Following Commands Follows all commands and directions without difficulty   RUE Assessment   RUE Assessment WFL   LUE Assessment   LUE Assessment WFL   RLE Assessment   RLE Assessment WFL   LLE Assessment   LLE Assessment WFL   Coordination   Movements are Fluid and Coordinated 1  (Coordination testing intact, heel to shin, finger opposition)   Sensation WFL   Light Touch   RLE Light Touch Grossly intact   LLE Light Touch Grossly intact   Proprioception   RLE Proprioception Grossly intact   LLE Proprioception Grossly Intact   Bed Mobility   Supine to Sit 7  Independent   Additional Comments Pt left sitting in chair at end of session   Transfers   Sit to Stand 7  Independent   Stand to Sit 7  Independent   Ambulation/Elevation   Gait pattern WNL   Gait Assistance 7  Independent   Assistive Device None   Distance 80 ft with stair negotiation snf   Stair Management Assistance 6  Modified independent   Stair Management Technique One rail R;Step to pattern; Alternating pattern  (step to descending, alternating ascending)   Number of Stairs 9   Balance   Static Sitting Normal   Dynamic Sitting Normal   Static Standing Good   Dynamic Standing Good   Ambulatory Good   Endurance Deficit   Endurance Deficit Yes   Endurance Deficit Description SOB with exertion, resolved with seated rest break and PLB  (6 month duration of SOB with activity)   Activity Tolerance   Activity Tolerance Patient limited by fatigue   Medical Staff Made Aware SLIM and CM   Nurse Made Aware DEON Thapa   Assessment   Prognosis Good   Problem List Decreased endurance  (Baseline for 6 months per pt)   Assessment Pt is a 78 y/o male admitted 4/26 due to TIA  PMH includes: prostate CA, COPD, essential HTN  Pt symptoms at onset included L facial droop, L UE paraesthesias, slurring speech, and difficulty with word finding   Pt states these symptoms lasted about 15 minutes  At time of IE, pt reports numbness in L fingers  When performing sensation testing, pt reports equal sensation in B hands  Pt reports all symptoms have subsided  CT/CTA (-) for abnormal findings  Pending MRI at  [facility] tonight  Pt lives alone in 3 story home with 7 DANI  Pt has about 15 steps to access third floor of home with bedroom and full bath  PTA, pt I with all mobility and ADLs/IADLs  Pt utilizes home delivery services for grocery shopping since beginning of pandemic  Pt currently presents with SOB upon exertion which pt states is his baseline for past 6 months  Pt reports follow up with PCP and routine physical scheduled within the next few days  At this time, pt is I with all mobility  Pt scored 24/24 on AM-PAC with standardized score of 57 68 indicating safe return to home  Pt scored 100/100 on Barthel Index indicating no impairment with functional mobility and ADL performance  Pt not appropriate for PT at this time  Recommend d/c home with increased social supports  Goals   Patient Goals To go home   Recommendation   PT Discharge Recommendation No rehabilitation needs  (Home with increased family support)   AM-PAC Basic Mobility Inpatient   Turning in Bed Without Bedrails 4   Lying on Back to Sitting on Edge of Flat Bed 4   Moving Bed to Chair 4   Standing Up From Chair 4   Walk in Room 4   Climb 3-5 Stairs 4   Basic Mobility Inpatient Raw Score 24   Basic Mobility Standardized Score 57 68   Barthel Index   Feeding 10   Bathing 5   Grooming Score 5   Dressing Score 10   Bladder Score 10   Bowels Score 10   Toilet Use Score 10   Transfers (Bed/Chair) Score 15   Mobility (Level Surface) Score 15   Stairs Score 10   Barthel Index Score 100     The patient's AM-PAC Basic Mobility Inpatient Short Form Raw Score is 24, Standardized Score is 57 68  A standardized score greater than 42 9 suggests the patient may benefit from discharge to home   Please also refer to the recommendation of the physical therapist for safe discharge planning        Jose Sanders, SPT

## 2021-04-28 VITALS
HEIGHT: 74 IN | BODY MASS INDEX: 39.7 KG/M2 | RESPIRATION RATE: 17 BRPM | TEMPERATURE: 97.6 F | HEART RATE: 70 BPM | WEIGHT: 309.31 LBS | DIASTOLIC BLOOD PRESSURE: 56 MMHG | OXYGEN SATURATION: 95 % | SYSTOLIC BLOOD PRESSURE: 99 MMHG

## 2021-04-28 LAB
ANION GAP SERPL CALCULATED.3IONS-SCNC: 9 MMOL/L (ref 4–13)
BASOPHILS # BLD AUTO: 0.03 THOUSANDS/ΜL (ref 0–0.1)
BASOPHILS NFR BLD AUTO: 1 % (ref 0–1)
BUN SERPL-MCNC: 18 MG/DL (ref 6–20)
CALCIUM SERPL-MCNC: 9.3 MG/DL (ref 8.4–10.2)
CHLORIDE SERPL-SCNC: 105 MMOL/L (ref 96–108)
CO2 SERPL-SCNC: 28 MMOL/L (ref 22–33)
CREAT SERPL-MCNC: 0.91 MG/DL (ref 0.5–1.2)
CRP SERPL QL: <0.1 MG/L (ref 0–1)
EOSINOPHIL # BLD AUTO: 0.16 THOUSAND/ΜL (ref 0–0.61)
EOSINOPHIL NFR BLD AUTO: 3 % (ref 0–6)
ERYTHROCYTE [DISTWIDTH] IN BLOOD BY AUTOMATED COUNT: 13.1 % (ref 11.6–15.1)
ERYTHROCYTE [SEDIMENTATION RATE] IN BLOOD: 14 MM/HOUR (ref 0–20)
GFR SERPL CREATININE-BSD FRML MDRD: 82 ML/MIN/1.73SQ M
GLUCOSE P FAST SERPL-MCNC: 123 MG/DL (ref 70–105)
GLUCOSE SERPL-MCNC: 108 MG/DL (ref 65–140)
GLUCOSE SERPL-MCNC: 123 MG/DL (ref 65–140)
GLUCOSE SERPL-MCNC: 128 MG/DL (ref 65–140)
HCT VFR BLD AUTO: 40.5 % (ref 36.5–49.3)
HGB BLD-MCNC: 13.2 G/DL (ref 12–17)
IMM GRANULOCYTES # BLD AUTO: 0.01 THOUSAND/UL (ref 0–0.2)
IMM GRANULOCYTES NFR BLD AUTO: 0 % (ref 0–2)
LYMPHOCYTES # BLD AUTO: 1.04 THOUSANDS/ΜL (ref 0.6–4.47)
LYMPHOCYTES NFR BLD AUTO: 18 % (ref 14–44)
MCH RBC QN AUTO: 31.3 PG (ref 26.8–34.3)
MCHC RBC AUTO-ENTMCNC: 32.6 G/DL (ref 31.4–37.4)
MCV RBC AUTO: 96 FL (ref 82–98)
MONOCYTES # BLD AUTO: 0.52 THOUSAND/ΜL (ref 0.17–1.22)
MONOCYTES NFR BLD AUTO: 9 % (ref 4–12)
NEUTROPHILS # BLD AUTO: 3.88 THOUSANDS/ΜL (ref 1.85–7.62)
NEUTS SEG NFR BLD AUTO: 69 % (ref 43–75)
PLATELET # BLD AUTO: 211 THOUSANDS/UL (ref 149–390)
PMV BLD AUTO: 12.1 FL (ref 8.9–12.7)
POTASSIUM SERPL-SCNC: 3.6 MMOL/L (ref 3.5–5)
RBC # BLD AUTO: 4.22 MILLION/UL (ref 3.88–5.62)
SODIUM SERPL-SCNC: 142 MMOL/L (ref 133–145)
TSH SERPL DL<=0.05 MIU/L-ACNC: 3.27 UIU/ML (ref 0.34–5.6)
VIT B12 SERPL-MCNC: 225 PG/ML (ref 100–900)
WBC # BLD AUTO: 5.64 THOUSAND/UL (ref 4.31–10.16)

## 2021-04-28 PROCEDURE — 84165 PROTEIN E-PHORESIS SERUM: CPT | Performed by: PATHOLOGY

## 2021-04-28 PROCEDURE — 85652 RBC SED RATE AUTOMATED: CPT | Performed by: PSYCHIATRY & NEUROLOGY

## 2021-04-28 PROCEDURE — 82652 VIT D 1 25-DIHYDROXY: CPT | Performed by: PSYCHIATRY & NEUROLOGY

## 2021-04-28 PROCEDURE — 86140 C-REACTIVE PROTEIN: CPT | Performed by: PSYCHIATRY & NEUROLOGY

## 2021-04-28 PROCEDURE — 84443 ASSAY THYROID STIM HORMONE: CPT | Performed by: PSYCHIATRY & NEUROLOGY

## 2021-04-28 PROCEDURE — 80048 BASIC METABOLIC PNL TOTAL CA: CPT | Performed by: INTERNAL MEDICINE

## 2021-04-28 PROCEDURE — 84425 ASSAY OF VITAMIN B-1: CPT | Performed by: PSYCHIATRY & NEUROLOGY

## 2021-04-28 PROCEDURE — 84165 PROTEIN E-PHORESIS SERUM: CPT | Performed by: PSYCHIATRY & NEUROLOGY

## 2021-04-28 PROCEDURE — 99239 HOSP IP/OBS DSCHRG MGMT >30: CPT | Performed by: INTERNAL MEDICINE

## 2021-04-28 PROCEDURE — 82607 VITAMIN B-12: CPT | Performed by: PSYCHIATRY & NEUROLOGY

## 2021-04-28 PROCEDURE — 86592 SYPHILIS TEST NON-TREP QUAL: CPT | Performed by: INTERNAL MEDICINE

## 2021-04-28 PROCEDURE — 82948 REAGENT STRIP/BLOOD GLUCOSE: CPT

## 2021-04-28 PROCEDURE — 85025 COMPLETE CBC W/AUTO DIFF WBC: CPT | Performed by: INTERNAL MEDICINE

## 2021-04-28 RX ORDER — ATORVASTATIN CALCIUM 40 MG/1
40 TABLET, FILM COATED ORAL DAILY
Qty: 28 TABLET | Refills: 0 | Status: SHIPPED | OUTPATIENT
Start: 2021-04-28 | End: 2021-05-26

## 2021-04-28 RX ORDER — CLOPIDOGREL BISULFATE 75 MG/1
75 TABLET ORAL DAILY
Qty: 21 TABLET | Refills: 0 | Status: SHIPPED | OUTPATIENT
Start: 2021-04-29 | End: 2021-04-28 | Stop reason: HOSPADM

## 2021-04-28 RX ADMIN — METOPROLOL TARTRATE 25 MG: 25 TABLET, FILM COATED ORAL at 10:23

## 2021-04-28 RX ADMIN — CLOPIDOGREL BISULFATE 75 MG: 75 TABLET ORAL at 10:25

## 2021-04-28 RX ADMIN — EZETIMIBE 10 MG: 10 TABLET ORAL at 10:26

## 2021-04-28 RX ADMIN — HEPARIN SODIUM 5000 UNITS: 5000 INJECTION INTRAVENOUS; SUBCUTANEOUS at 05:45

## 2021-04-28 RX ADMIN — Medication 1 TABLET: at 10:25

## 2021-04-28 RX ADMIN — ASPIRIN 81 MG: 81 TABLET, CHEWABLE ORAL at 10:25

## 2021-04-28 NOTE — DISCHARGE SUMMARY
Vinay U  67   Discharge- Diane Cai 1945, 77 y o  male MRN: [id number]  Unit/Bed#: -Ranjith Encounter: [id number]  Primary Care Provider: No primary care provider on file  Date and time admitted to hospital: 4/26/2021  4:27 PM    * TIA (transient ischemic attack)  Assessment & Plan   Presented with aphasia, left-sided facial droop, left upper extremity paresthesias  Last well known time:  Approximately 3:30 p m  Symptoms resolved within approximately 10 to 15 minutes   Risk factors of atrial fibrillation, hypertension, medication noncompliance, pre diabetes   Home medication regimen:  Aspirin 81 mg OD, Zetia 10 mg OD, metoprolol tartrate 25 mg BD, rosuvastatin 10 mg OD, irbesartan 150 mg OD; patient has run out of his medications a few weeks ago has not yet gotten refills   Neuro exam findings:  No focal neurological deficits, A X O X 3  CT head findings:  No acute intracranial abnormality   NIH score:   Of 0 at presentation; 0 at time of my evaluation   CT head x2:   No mass effect, acute intracranial hemorrhage or CT evidence for a large acute vascular distribution infarct  [facility] Neurology consulted:  Recommending continuation on Lipitor, okay to restart Eliquis of MRI does not show however   HbA1c:  6 6   Lipid panel:  Non impressive    Outpatient MRI recommended, patient given prescription  Outpatient follow-up with patient's cardiologist  Outpatient follow-up with patient's primary care physician      CKD (chronic kidney disease) stage 2, GFR 60-89 ml/min  Assessment & Plan  Lab Results   Component Value Date    EGFR 82 04/28/2021    EGFR 65 04/26/2021    EGFR 77 06/13/2018    CREATININE 0 91 04/28/2021    CREATININE 1 11 04/26/2021    CREATININE 0 98 06/13/2018     -stable  -patient from out of town, returning back home in less than 1 week  -outpatient follow-up with primary care physician at discharge    Essential hypertension  Assessment & Plan  Continuing home medication    Atrial fibrillation ([facility] [address] )  Assessment & Plan  -known diagnosis; patient follows with outpatient Cardiology -Genaro Padron affiliated with [facility]  -patient should be on metoprolol tartrate 25 mg b i d  Along with Eliquis 5 mg BD  -non compliant as he has run out medication refills and has not yet had a chance to see his provider  -EKG on presentation showing rate controlled Afib  -echo showing moderately reduced systolic function with an EF of 35-40%  Severe diffuse hypokinesis also present in left ventricle  Wall thickness is moderately increased with moderate concentric hypertrophy  Restarting home meds at discharge  Outpatient follow-up with patient's cardiologist  Outpatient follow-up with patient's primary care physician    Systolic [facility])  Assessment & Plan  -not an accurate historian, is not aware if he has heart failure, does follow with outpatient cardiology, reports he has had multiple echoes done in the past  -echo reports not available on chart review; inpatient echo done   -inpatient echo: Moderately reduced systolic function with  EF of 35-40%, severe diffuse hypokinesis with distinct regional wall motion abnormalities  Wall thickness moderately increased with moderate concentric hypertrophy  -patient presented with dyspnea for the last few weeks, associated with bilateral lower extremity pitting edema extending up to knees  -patient's home medication regimen should include Lasix 20 mg on Mondays Wednesdays and Fridays, however he is noncompliant  -denies any recent weight gains  -no prior echo on chart review      Continuing home medication; rosuvastatin 5 mg replaced with atorvastatin 40 mg OD          PCP: No primary care provider on file  Admission Date: 4/26/2021  Discharge Date: 04/28/21    Disposition:     Home    Reason for Admission:  TIA    Consultations During Hospital Stay:  · Neurology    Procedures Performed:     · Echo:  LVEF of 35-40%;     Primary diagnosis:    TIA; symptoms have resolved    Secondary diagnosis:   Systolic CHF; stable    Significant Findings / Test Results:     · CT: No mass effect, acute intracranial hemorrhage or CT evidence for a large acute vascular distribution infarct  · A1c:  6 6  · Echo:  LVEF of 35-40%  · EKG:  Rate controlled AFib    Incidental Findings:   · Non     Test Results Pending at Discharge (will require follow up): · None     Outpatient Tests Requested:  · MRI brain    Complications:  None    HPI:    Jose Orellana is a 77 y o  male who presents with PMH-atrial fibrillation, hypertension, hyperlipidemia, pre diabetes  Patient presenting with complaint of aphasia, left-sided facial droop, left-sided upper extremity paresthesias lasting approximately 15 minutes  He reports he was in the middle of a conversation with his daughter, and had a sudden difficulty with finding words  His daughter, who was present at bedside, reports his aphasia was associated with a left-sided facial droop  He denies any headache, noted focal neurological weakness, changes in vision, loss of consciousness, associated chest pain, or palpitations  He does report he has been feeling increasingly dyspneic over the last few weeks, aggravated by exertion  Associated with bilateral lower extremity edema  Denies any recent weight gains  He does report he has run out of his medications a few weeks ago, and has not had a chance to get them refilled      In the ED, stroke alert was called  By the time of presentation, patient's symptoms had completely resolved  Patient had initial NIH score of 0 on presentation, CTA head was done with no acute intracranial pathology  Neurology on-call was consulted:  TPA not indicated for patient as his symptoms have resolved prior to presentation  Neurology also recommending loading with aspirin and Plavix which was done, along with tele monitoring echo and MRI    Patient lab work consisting of  CBC, troponin have thus far been unremarkable  BMP also unremarkable with the exception of EGFR of 65       By the time of my evaluation, patient has not had a reoccurrence of his symptoms  NIH score of 0  Hospital Course:     Over the course of his admission, patient's NIH score remained 0  He did not have a reoccurrence of his symptoms  Neurology was consulted  Repeat CT head was done which showed No significant interval change, No mass effect, acute intracranial hemorrhage or CT evidence for a large acute vascular distribution infarct  Patient was in rate controlled AFib throughout his stay with us  As part of TIA workup, patient underwent an echo-showing LVEF of 35-40%  Patient's outpatient cardiologist's office was contacted and prior echo records were faxed over to us  Previous echo in 2019 shows an EF of 40-45%, in view of this, no current inpatient cardiac workup required  Ideally, patient would also have undergone an inpatient MRI, however patient does have a [employer] pacemaker in place, causing logistical difficulties  Neurology was updated regarding this, and patient was cleared to be discharged with an outpatient MRI  Patient was recommended to continue all of his medications  Patient's cholesterol medication was changed from rosuvastatin 5 mg OD to atorvastatin 40 mg OD  Patient was also recommended to follow-up with his primary care physician and outpatient cardiologist regarding the course of his hospitalization  At discharge patient was given an outpatient MRI script  Patient is agreeable with current discharge plan  Condition at Discharge: good     Discharge Day Visit / Exam:     Subjective:  Patient was seen and examined by me at bedside  Communicated clearly  No particular overnight event reported  Hemodynamically stable and afebrile  Pt has no new complaints       Vitals: Blood Pressure: 99/56 (04/28/21 1100)  Pulse: 70 (04/28/21 1100)  Temperature: 97 6 °F (36 4 °C) (04/28/21 1100)  Temp Source: Tympanic (04/28/21 1100)  Respirations: 17 (04/28/21 1100)  Height: 6' 2" (188 cm) (04/26/21 1900)  Weight - Scale: (!) 140 kg (309 lb 4 9 oz) (04/28/21 0600)  SpO2: 95 % (04/28/21 1100)  Exam:   Physical Exam  Vitals signs reviewed  Constitutional:       Appearance: Normal appearance  HENT:      Head: Normocephalic and atraumatic  Nose: Nose normal    Eyes:      General: No scleral icterus  Right eye: No discharge  Left eye: No discharge  Neck:      Musculoskeletal: Normal range of motion  Cardiovascular:      Rate and Rhythm: Normal rate  Rhythm irregular  Pulses: Normal pulses  Pulmonary:      Effort: Pulmonary effort is normal  No respiratory distress  Breath sounds: Normal breath sounds  Abdominal:      General: Bowel sounds are normal  There is no distension  Palpations: Abdomen is soft  Tenderness: There is no abdominal tenderness  Musculoskeletal: Normal range of motion  Skin:     General: Skin is warm and dry  Neurological:      General: No focal deficit present  Mental Status: He is alert and oriented to person, place, and time  Psychiatric:         Mood and Affect: Mood normal          Behavior: Behavior normal            Discussion with Family: left a voicemail for patients Yaokv boothe Rounds  Discharge instructions/Information to patient and family:   See after visit summary for information provided to patient and family  Provisions for Follow-Up Care:  See after visit summary for information related to follow-up care and any pertinent home health orders  Planned Readmission: none     Discharge Medications:  See after visit summary for reconciled discharge medications provided to patient and family        ** Please Note: This note has been constructed using a voice recognition system **

## 2021-04-28 NOTE — CASE MANAGEMENT
Patient requesting medical records sent to PCP  Medical release form completed and signed by patient  CM faxed form to medical release center at [phone]

## 2021-04-28 NOTE — ASSESSMENT & PLAN NOTE
 Presented with aphasia, left-sided facial droop, left upper extremity paresthesias  Last well known time:  Approximately 3:30 p m  Symptoms resolved within approximately 10 to 15 minutes   Risk factors of atrial fibrillation, hypertension, medication noncompliance, pre diabetes   Home medication regimen:  Aspirin 81 mg OD, Zetia 10 mg OD, metoprolol tartrate 25 mg BD, rosuvastatin 10 mg OD, irbesartan 150 mg OD; patient has run out of his medications a few weeks ago has not yet gotten refills   Neuro exam findings:  No focal neurological deficits, A X O X 3  CT head findings:  No acute intracranial abnormality   NIH score:   Of 0 at presentation; 0 at time of my evaluation   CT head x2:   No mass effect, acute intracranial hemorrhage or CT evidence for a large acute vascular distribution infarct  [facility] Neurology consulted:  Recommending continuation on Lipitor, okay to restart Eliquis of MRI does not show however   HbA1c:  6 6   Lipid panel:  Non impressive    Outpatient MRI recommended, patient given prescription  Outpatient follow-up with patient's cardiologist  Outpatient follow-up with patient's primary care physician

## 2021-04-28 NOTE — ASSESSMENT & PLAN NOTE
Lab Results   Component Value Date    EGFR 82 04/28/2021    EGFR 65 04/26/2021    EGFR 77 06/13/2018    CREATININE 0 91 04/28/2021    CREATININE 1 11 04/26/2021    CREATININE 0 98 06/13/2018     -stable  -patient from out of town, returning back home in less than 1 week  -outpatient follow-up with primary care physician at discharge

## 2021-04-28 NOTE — CASE MANAGEMENT
LOS 1 DAY  RISK OF UNPLANNED READMISSION SCORE N/A  30 DAY READMISSION: NO  BUNDLE: NO    CM s/w patient bedside  Patient reports living alone in a 3-story home with 4 steps to the porch and 3 steps to enter the first floor  Patient states he was IPTA with all ADLs and that family lives locally to provide assistance if/when needed  No Hx VNA, STR, MH, or SA identified  PCP: Maria G Londono (Phone: [phone] / Fax: [phone])    Preferred Pharmacy: [facility], no barriers identified to obtaining Rx from that location  No formal POA identified at this time  CM reviewed name, role, discharge planning process, and encouraged follow-up with all recommended appointments and providers after discharge  Patient reports that his family will provide transport home when cleared for discharge  Discharge Plan: Return home with family support when cleared for discharge

## 2021-04-28 NOTE — NURSING NOTE
Patient is discharged home  Daughter is here to  a patient via private car  All belongings are accounted for and released to the patient  Medication list is given to the patient and explained to the patient  Patient is in no distress upon discharge

## 2021-04-28 NOTE — PLAN OF CARE
Problem: Potential for Falls  Goal: Patient will remain free of falls  Description: INTERVENTIONS:  - Assess patient frequently for physical needs  -  Identify cognitive and physical deficits and behaviors that affect risk of falls    -  [place] fall precautions as indicated by assessment   - Educate patient/family on patient safety including physical limitations  - Instruct patient to call for assistance with activity based on assessment  - Modify environment to reduce risk of injury  - Consider OT/PT consult to assist with strengthening/mobility  Outcome: Progressing     Problem: PAIN - ADULT  Goal: Verbalizes/displays adequate comfort level or baseline comfort level  Description: Interventions:  - Encourage patient to monitor pain and request assistance  - Assess pain using appropriate pain scale  - Administer analgesics based on type and severity of pain and evaluate response  - Implement non-pharmacological measures as appropriate and evaluate response  - Consider cultural and social influences on pain and pain management  - Notify physician/advanced practitioner if interventions unsuccessful or patient reports new pain  Outcome: Progressing     Problem: INFECTION - ADULT  Goal: Absence or prevention of progression during hospitalization  Description: INTERVENTIONS:  - Assess and monitor for signs and symptoms of infection  - Monitor lab/diagnostic results  - Monitor all insertion sites, i e  indwelling lines, tubes, and drains  - Monitor endotracheal if appropriate and nasal secretions for changes in amount and color  - [place] appropriate cooling/warming therapies per order  - Administer medications as ordered  - Instruct and encourage patient and family to use good hand hygiene technique  - Identify and instruct in appropriate isolation precautions for identified infection/condition  Outcome: Progressing     Problem: SAFETY ADULT  Goal: Patient will remain free of falls  Description: INTERVENTIONS:  - Assess patient frequently for physical needs  -  Identify cognitive and physical deficits and behaviors that affect risk of falls    -  [place] fall precautions as indicated by assessment   - Educate patient/family on patient safety including physical limitations  - Instruct patient to call for assistance with activity based on assessment  - Modify environment to reduce risk of injury  - Consider OT/PT consult to assist with strengthening/mobility  Outcome: Progressing  Goal: Maintain or return to baseline ADL function  Description: INTERVENTIONS:  -  Assess patient's ability to carry out ADLs; assess patient's baseline for ADL function and identify physical deficits which impact ability to perform ADLs (bathing, care of mouth/teeth, toileting, grooming, dressing, etc )  - Assess/evaluate cause of self-care deficits   - Assess range of motion  - Assess patient's mobility; develop plan if impaired  - Assess patient's need for assistive devices and provide as appropriate  - Encourage maximum independence but intervene and supervise when necessary  - Involve family in performance of ADLs  - Assess for home care needs following discharge   - Consider OT consult to assist with ADL evaluation and planning for discharge  - Provide patient education as appropriate  Outcome: Progressing     Problem: DISCHARGE PLANNING  Goal: Discharge to home or other facility with appropriate resources  Description: INTERVENTIONS:  - Identify barriers to discharge w/patient and caregiver  - Arrange for needed discharge resources and transportation as appropriate  - Identify discharge learning needs (meds, wound care, etc )  - Arrange for interpretive services to assist at discharge as needed  - Refer to Case Management Department for coordinating discharge planning if the patient needs post-hospital services based on physician/advanced practitioner order or complex needs related to functional status, cognitive ability, or social support system  Outcome: Progressing     Problem: Knowledge Deficit  Goal: Patient/family/caregiver demonstrates understanding of disease process, treatment plan, medications, and discharge instructions  Description: Complete learning assessment and assess knowledge base  Interventions:  - Provide teaching at level of understanding  - Provide teaching via preferred learning methods  Outcome: Progressing     Problem: Neurological Deficit  Goal: Neurological status is stable or improving  Description: Interventions:  - Monitor and assess patient's level of consciousness, motor function, sensory function, and level of assistance needed for ADLs  - Monitor and report changes from baseline  Collaborate with interdisciplinary team to initiate plan and implement interventions as ordered  - Provide and maintain a safe environment  - Consider seizure precautions  - Consider fall precautions  - Consider aspiration precautions  - Consider bleeding precautions  Outcome: Progressing     Problem: Activity Intolerance/Impaired Mobility  Goal: Mobility/activity is maintained at optimum level for patient  Description: Interventions:  - Assess and monitor patient  barriers to mobility and need for assistive/adaptive devices  - Assess patient's emotional response to limitations  - Collaborate with interdisciplinary team and initiate plans and interventions as ordered  - Encourage independent activity per ability   - Maintain proper body alignment  - Perform active/passive rom as tolerated/ordered  - Plan activities to conserve energy   - Turn patient as appropriate  Outcome: Progressing     Problem: Communication Impairment  Goal: Ability to express needs and understand communication  Description: Assess patient's communication skills and ability to understand information  Patient will demonstrate use of effective communication techniques, alternative methods of communication and understanding even if not able to speak       - Encourage communication and provide alternate methods of communication as needed  - Collaborate with case management/ for discharge needs  - Include patient/family/caregiver in decisions related to communication    Outcome: Progressing

## 2021-04-28 NOTE — DISCHARGE INSTRUCTIONS
Basic Carbohydrate Counting   WHAT YOU NEED TO KNOW:   Carbohydrate counting is a way to plan your meals by counting the amount of carbohydrate in foods  Carbohydrates are the sugars, starches, and fiber found in fruit, grains, vegetables, and milk products  Carbohydrates increase your blood sugar levels  Carbohydrate counting can help you eat the right amount of carbohydrate to keep your blood sugar levels under control  DISCHARGE INSTRUCTIONS:   What you need to know about planning meals using carbohydrate counting:  · A dietitian or healthcare provider will help you develop a healthy meal plan that works best for you  You will be taught how much carbohydrate to eat or drink for each meal and snack  Your meal plan will be based on your age, weight, usual food intake, and physical activity level  If you have diabetes, it will also include your blood sugar levels and diabetes medicine  Once you know how much carbohydrate you should eat, you can decide what type of food you want to eat  · You will need to know what foods contain carbohydrate and how much they contain  Keep track of the amount of carbohydrate in meals and snacks in order to follow your meal plan  Do not avoid carbohydrates or skip meals  Your blood sugar may fall too low if you do not eat enough carbohydrate or you skip meals  Foods that contain carbohydrate:   · Breads:  Each serving of food listed below contains about 15 g of carbohydrate   ? 1 slice of bread (1 ounce) or 1 flour or corn tortilla (6 inch)    ? ½ of a hamburger bun or ¼ of a large bagel (about 1 ounce)    ? 1 pancake (about 4 inches across and ¼ inch thick)    · Cereals and grains:  Serving sizes of ready-to-eat cereals vary  Look at the serving size and the total carbohydrate amount listed on the food label  Each serving of food listed below contains about 15 g of carbohydrate   ? ¾ cup of dry, unsweetened, ready-to-eat cereal or ¼ cup of low-fat granola     ?  ½ cup of oatmeal or other cooked cereal     ? ? cup of cooked rice or pasta    · Starchy vegetables and beans:  Each serving of food listed below contains about 15 g of carbohydrate   ? ½ cup of corn, green peas, sweet potatoes, or mashed potatoes    ? ¼ of a large baked potato    ? ½ cup of beans, lentils, and peas (garbanzo, ricketts, kidney, white, split, black-eyed)    · Crackers and snacks:  Each serving of food listed below contains about 15 g of carbohydrate   ? 3 luis cracker squares or 8 animal crackers     ? 6 saltine-type crackers    ? 3 cups of popcorn or ¾ ounce of pretzels, potato chips, or tortilla chips    · Fruit:  Each serving of food listed below contains about 15 g of carbohydrate   ? 1 small (4 ounce) piece of fresh fruit or ¾ to 1 cup of fresh fruit    ? ½ cup of canned or frozen fruit, packed in natural juice    ? ½ cup (4 ounces) of unsweetened fruit juice    ? 2 tablespoons of dried fruit    · Desserts or sugary foods:  Each serving of food listed below contains about 15 g of carbohydrate   ? 2-inch square unfrosted cake or brownie     ? 2 small cookies    ? ½ cup of ice cream, frozen yogurt, or nondairy frozen yogurt    ? ¼ cup of sherbet or sorbet    ? 1 tablespoon of regular syrup, jam, or jelly    ? 2 tablespoons of light syrup    · Milk and yogurt:  Foods from the milk group contain about 12 g of carbohydrate per serving  ? 1 cup of fat-free or low-fat milk    ? 1 cup of soy milk    ? ? cup of fat-free, yogurt sweetened with artificial sweetener    · Non-starchy vegetables:  Each serving contains about 5 g of carbohydrate   Three servings of non-starch vegetables count as 1 carbohydrate serving  ? ½ cup of cooked vegetables or 1 cup of raw vegetables  This includes beets, broccoli, cabbage, cauliflower, cucumber, mushrooms, tomatoes, and zucchini    ?  ½ cup of vegetable juice    How to use carbohydrate counting to plan meals:   · Count carbohydrate amounts using serving sizes:      ? Pasta dinner example: You plan to have pasta, tossed salad, and an 8-ounce glass of milk  Your healthcare provider tells you that you may have 4 carbohydrate servings for dinner  One carbohydrate serving of pasta is ? cup  One cup of pasta will equal 3 carbohydrate servings  An 8-ounce glass of milk will count as 1 carbohydrate serving  These amounts of food would equal 4 carbohydrate servings  One cup of tossed salad does not count toward your carbohydrate servings  · Count carbohydrate amounts using food labels:  Find the total amount of carbohydrate in a packaged food by reading the food label  Food labels tell you the serving size of the food and the total carbohydrate amount in each serving  Find the serving size on the food label and then decide how many servings you will eat  Multiply the number of servings you plan to eat by the carbohydrate amount per serving  ? Granola bar snack example: Your meal plan allows you to have 2 carbohydrate servings (30 grams) of carbohydrate for a snack  You plan to eat 1 package of granola bars, which contains 2 bars  According to the food label, the serving size of food in this package is 1 bar  Each serving (1 bar) contains 25 grams of carbohydrate  The total amount of carbohydrate in this package of granola bars would be 50 g  Based on your meal plan, you should eat only 1 bar  Follow up with your healthcare provider as directed:  Write down your questions so you remember to ask them during your visits  © Copyright [address] Information is for End User's use only and may not be sold, redistributed or otherwise used for commercial purposes  All illustrations and images included in CareNotes® are the copyrighted property of A D A M , Inc  or [facility] Rashi Pizarro   The above information is an  only  It is not intended as medical advice for individual conditions or treatments   Talk to your doctor, nurse or pharmacist before following any medical regimen to see if it is safe and effective for you  Transient Ischemic Attack   WHAT YOU NEED TO KNOW:   A transient ischemic attack (TIA), or mini-stroke, happens when blood cannot flow to part of the brain  A TIA only lasts minutes to hours and does not cause lasting damage  It is still important to get immediate medical care  A TIA may be a warning that you are about to have an ischemic stroke  An ischemic stroke happens when blood flow to the brain is suddenly blocked, usually by a blood clot  DISCHARGE INSTRUCTIONS:   Call your local emergency number (911 in the [address] [place] [address],[address]) or have someone else call if:   · You have any of the following signs of a stroke:      ? Numbness or drooping on one side of your face     ? Weakness in an arm or leg    ? Confusion or difficulty speaking    ? Dizziness, a severe headache, or vision loss       · You have a seizure  · You have chest pain or shortness of breath  · You cough up blood  Return to the emergency department if:   · Your arm or leg feels warm, tender, and painful  It may look swollen and red  · You have unusual or heavy bleeding  · You have a severe headache or feel dizzy  Call your doctor or neurologist if:   · Your blood pressure or blood sugar level is higher or lower than you were told it should be  · You have questions or concerns about your condition or care  Warning signs of a stroke: The words BE FAST can help you remember and recognize warning signs of a stroke:  · B = Balance:  Sudden loss of balance    · E = Eyes:  Loss of vision in one or both eyes    · F = Face:  Face droops on one side    · A = Arms:  Arm drops when both arms are raised    · S = Speech:  Speech is slurred or sounds different    · T = Time:  Time to get help immediately     Medicines: You may need any of the following:  · Antiplatelets , such as aspirin, help prevent blood clots   Take your antiplatelet medicine exactly as directed  These medicines make it more likely for you to bleed or bruise  If you are told to take aspirin, do not take acetaminophen or ibuprofen instead  · Blood thinners  help prevent blood clots  Clots can cause strokes, heart attacks, and death  The following are general safety guidelines to follow while you are taking a blood thinner:    ? Watch for bleeding and bruising while you take blood thinners  Watch for bleeding from your gums or nose  Watch for blood in your urine and bowel movements  Use a soft washcloth on your skin, and a soft toothbrush to brush your teeth  This can keep your skin and gums from bleeding  If you shave, use an electric shaver  Do not play contact sports  ? Tell your dentist and other healthcare providers that you take a blood thinner  Wear a bracelet or necklace that says you take this medicine  ? Do not start or stop any other medicines unless your healthcare provider tells you to  Many medicines cannot be used with blood thinners  ? Take your blood thinner exactly as prescribed by your healthcare provider  Do not skip does or take less than prescribed  Tell your provider right away if you forget to take your blood thinner, or if you take too much  ? Warfarin  is a blood thinner that you may need to take  The following are things you should be aware of if you take warfarin:     § Foods and medicines can affect the amount of warfarin in your blood  Do not make major changes to your diet while you take warfarin  Warfarin works best when you eat about the same amount of vitamin K every day  Vitamin K is found in green leafy vegetables and certain other foods  Ask for more information about what to eat when you are taking warfarin  § You will need to see your healthcare provider for follow-up visits when you are on warfarin  You will need regular blood tests  These tests are used to decide how much medicine you need      · Other medicines  may be needed to treat diabetes, depression, high cholesterol, or blood pressure problems  You may also need medicine to decrease the pressure in your brain, reduce pain, or prevent seizures  · Take your medicine as directed  Contact your healthcare provider if you think your medicine is not helping or if you have side effects  Tell him of her if you are allergic to any medicine  Keep a list of the medicines, vitamins, and herbs you take  Include the amounts, and when and why you take them  Bring the list or the pill bottles to follow-up visits  Carry your medicine list with you in case of an emergency  What you can do to prevent another TIA or a stroke:   · Manage health conditions  A condition such as diabetes can increase your risk for a stroke  Control your blood sugar level if you have hyperglycemia or diabetes  Take your prescribed medicines and check your blood sugar level as directed  · Check your blood pressure as directed  High blood pressure can increase your risk for a stroke  If you have high blood pressure, follow your healthcare provider's directions for controlling your blood pressure  · Do not use nicotine products or illegal drugs  Nicotine and other chemicals in cigarettes and cigars can cause blood vessel damage  Nicotine and illegal drugs both increase your risk for a stroke  Ask your healthcare provider for information if you currently smoke or use drugs and need help to quit  E- cigarettes or smokeless tobacco still contain nicotine  Talk to your healthcare provider before you use these products  · Talk to your healthcare provider about alcohol  Alcohol can raise your blood pressure  The recommended limit is 2 drinks in a day for men and 1 drink in a day for women  Do not binge drink or save a week's worth of alcohol to drink in 1 or 2 days  Limit weekly amounts as directed by your provider  · Eat a variety of healthy foods    Healthy foods include whole-grain breads, low-fat dairy products, beans, lean meats, and fish  Eat at least 5 servings of fruits and vegetables each day  Choose foods that are low in fat, cholesterol, salt, and sugar  Eat foods that are high in potassium, such as potatoes and bananas  A dietitian can help you create healthy meal plans  · Maintain a healthy weight  Ask your healthcare provider how much you should weigh  Ask him or her to help you create a weight loss plan if you are overweight  He or she can help you create small goals if you have a lot of weight to lose  · Exercise as directed  Exercise can lower your blood pressure, cholesterol, weight, and blood sugar levels  Healthcare providers will help you create exercise goals  They can also help you make a plan to reach your goals  For example, you can break exercise into 10 minute periods, 3 times in the day  Find an exercise that you enjoy  This will make it easier for you to reach your exercise goals  · Manage stress  Stress can raise your blood pressure  Find ways to relax, such as deep breathing or listening to music  Follow up with your doctor or neurologist in 1 to 2 days:  Write down your questions so you remember to ask them during your visits  © Copyright [employer] 2021 Information is for End User's use only and may not be sold, redistributed or otherwise used for commercial purposes  All illustrations and images included in CareNotes® are the copyrighted property of [employer]  or [facility] Rashi Pizarro   The above information is an  only  It is not intended as medical advice for individual conditions or treatments  Talk to your doctor, nurse or pharmacist before following any medical regimen to see if it is safe and effective for you  A-fib (Atrial Fibrillation)   WHAT YOU NEED TO KNOW:   A-fib may come and go, or it may be a long-term condition  A-fib can cause blood clots, stroke, or heart failure  These conditions may become life-threatening   It is important to treat and manage A-fib to help prevent a blood clot, stroke, or heart failure  DISCHARGE INSTRUCTIONS:   Call your local emergency number (911 in the [address] [facility]) or have someone call if:   · You have any of the following signs of a heart attack:      ? Squeezing, pressure, or pain in your chest    ? You may  also have any of the following:     § Discomfort or pain in your back, neck, jaw, stomach, or arm    § Shortness of breath    § Nausea or vomiting    § Lightheadedness or a sudden cold sweat    · You have any of the following signs of a stroke:      ? Numbness or drooping on one side of your face     ? Weakness in an arm or leg    ? Confusion or difficulty speaking    ? Dizziness, a severe headache, or vision loss    Call your doctor or cardiologist if:   · Your arm or leg feels warm, tender, and painful  It may look swollen and red  · Your heart rate is more than 110 beats per minute  · You have new or worsening swelling in your legs, feet, ankles, or abdomen  · You are short of breath, even at rest     · You have questions or concerns about your condition or care  Medicines: You may need any of the following:  · Heart medicines  help control your heart rate or rhythm  You may need more than one medicine to treat your symptoms  · Blood thinners  help prevent blood clots  Clots can cause strokes, heart attacks, and death  The following are general safety guidelines to follow while you are taking a blood thinner:    ? Watch for bleeding and bruising while you take blood thinners  Watch for bleeding from your gums or nose  Watch for blood in your urine and bowel movements  Use a soft washcloth on your skin, and a soft toothbrush to brush your teeth  This can keep your skin and gums from bleeding  If you shave, use an electric shaver  Do not play contact sports  ? Tell your dentist and other healthcare providers that you take a blood thinner   Wear a bracelet or necklace that says you take this medicine  ? Do not start or stop any other medicines unless your healthcare provider tells you to  Many medicines cannot be used with blood thinners  ? Take your blood thinner exactly as prescribed by your healthcare provider  Do not skip does or take less than prescribed  Tell your provider right away if you forget to take your blood thinner, or if you take too much  ? Warfarin  is a blood thinner that you may need to take  The following are things you should be aware of if you take warfarin:     § Foods and medicines can affect the amount of warfarin in your blood  Do not make major changes to your diet while you take warfarin  Warfarin works best when you eat about the same amount of vitamin K every day  Vitamin K is found in green leafy vegetables and certain other foods  Ask for more information about what to eat when you are taking warfarin  § You will need to see your healthcare provider for follow-up visits when you are on warfarin  You will need regular blood tests  These tests are used to decide how much medicine you need  · Antiplatelets , such as aspirin, help prevent blood clots  Take your antiplatelet medicine exactly as directed  These medicines make it more likely for you to bleed or bruise  If you are told to take aspirin, do not take acetaminophen or ibuprofen instead  · Take your medicine as directed  Contact your healthcare provider if you think your medicine is not helping or if you have side effects  Tell him or her if you are allergic to any medicine  Keep a list of the medicines, vitamins, and herbs you take  Include the amounts, and when and why you take them  Bring the list or the pill bottles to follow-up visits  Carry your medicine list with you in case of an emergency  Manage A-fib:   · Know your target heart rate  Learn how to check your pulse and monitor your heart rate  · Know the risks if you choose to drink alcohol    Alcohol can increase your risk for A-fib or make A-fib harder to manage  Ask your healthcare provider if it is okay for you to drink any alcohol  He or she can help you set limits for the number of drinks you have in 24 hours and in a week  A drink of alcohol is 12 ounces of beer, 5 ounces of wine, or 1½ ounces of liquor  · Do not smoke  Nicotine can cause heart damage and make it more difficult to manage your A-fib  Do not use e-cigarettes or smokeless tobacco in place of cigarettes or to help you quit  They still contain nicotine  Ask your healthcare provider for information if you currently smoke and need help quitting  · Eat heart-healthy foods  Heart healthy foods will help keep your cholesterol low  These include fruits, vegetables, whole-grain breads, low-fat dairy products, beans, lean meats, and fish  Replace butter and margarine with heart-healthy oils such as olive oil and canola oil  · Maintain a healthy weight  Ask your healthcare provider what a healthy weight is for you  Ask him or her to help you create a safe weight loss plan if you are overweight  Even a small goal of a 10% weight loss can improve your heart health  · Get regular physical activity  Physical activity helps improve your heart health  Get at least 150 minutes of moderate aerobic physical activity each week  Your healthcare provider can help you create an activity plan  · Manage other health conditions  This includes high blood pressure or cholesterol, sleep apnea, diabetes, and other heart conditions  Take medicine as directed and follow your treatment plan  Your healthcare provider may need to change a medicine you are taking if it is causing your A-fib  Do not  stop taking any medicine unless directed by your provider  Follow up with your doctor or cardiologist as directed: You will need regular blood tests and monitoring  Write down your questions so you remember to ask them during your visits    © Copyright [employer] 2021 Information is for [employer] use only and may not be sold, redistributed or otherwise used for commercial purposes  All illustrations and images included in CareNotes® are the copyrighted property of A D A M , Inc  or Pa Medrano  The above information is an  only  It is not intended as medical advice for individual conditions or treatments  Talk to your doctor, nurse or pharmacist before following any medical regimen to see if it is safe and effective for you

## 2021-04-28 NOTE — MALNUTRITION/BMI
This medical record reflects one or more clinical indicators suggestive of malnutrition and/or morbid obesity  Malnutrition Findings:              BMI Findings:  Adult BMI Classifications: Morbid Obesity 40-44 9     Body mass index is 39 71 kg/m²  See Nutrition note dated 4/28/2021 for additional details  Completed nutrition assessment is viewable in the nutrition documentation

## 2021-04-28 NOTE — ASSESSMENT & PLAN NOTE
-known diagnosis; patient follows with outpatient Cardiology -[facility] affiliated with [facility]  -patient should be on metoprolol tartrate 25 mg b i d  Along with Eliquis 5 mg BD  -non compliant as he has run out medication refills and has not yet had a chance to see his provider  -EKG on presentation showing rate controlled Afib  -echo showing moderately reduced systolic function with an EF of 35-40%  Severe diffuse hypokinesis also present in left ventricle  Wall thickness is moderately increased with moderate concentric hypertrophy      Restarting home meds at discharge  Outpatient follow-up with patient's cardiologist  Outpatient follow-up with patient's primary care physician

## 2021-04-28 NOTE — UTILIZATION REVIEW
Initial Clinical Review    Admission: Date/Time/Statement:   Admission Orders (From admission, onward)     Ordered        04/26/21 1730  Place in Observation  Once                Orders Placed This Encounter   Procedures    Place in Observation     Standing Status:   Standing     Number of Occurrences:   1     Order Specific Question:   Level of Care     Answer:   Med Surg [16]     ED Arrival Information     Expected Arrival Acuity Means of Arrival Escorted By Service Admission Type    - 4/26/2021 16:27 [facility] [id number] Emergency    Arrival Complaint    Dysarthria + left facial droop     Chief Complaint   Patient presents with   Loli Martinez     pt states he was tlaking to his daughter and he noticed he was trying to talk to her, but the words weren't coming out  Initial Presentation:   77year old male with PMHx HTN, HLD, atrial fibrillation not on anticoagulation, pre diabetes, CKD 2, Depression  Presented via EMS to [facility] ED on 4/27/21 2nd approx 15 minute episode of aphasia with left-sided facial droop and left-sided upper extremity paresthesias  He reports he was in the middle of a conversation with his daughter when he had sudden difficulty with finding words  Daughter reported his aphasia was associated with a left-sided facial droop  Patient reports feeling increasingly dyspneic over the last few weeks, aggravated by exertion and associated with bilateral lower extremity edema  In ED - HR 72  /69  In ED - stroke alert was called but by the time of presentation, his symptoms had completely resolved  Initial NIH score of 0 on presentation, CTA head showed no acute intracranial pathology  Labs wnl -   Neurology was consulted - TPA not indicated  Neurology recommmended loading with aspirin and Plavix    Placed in Observation 4/26/21 at 1730 2nd episode of aphasia/ word finding difficulty with left-sided facial droop and left upper extremity paresthesias - possible TIA - Plan:Telemetry, ECHO, Repeat CT versus MRI Brain  4/27 Neurology Consult:  77year old man with afib not currently on AC, HTN, prostate CA, CKD2, depression, presenting with dysarthria and facial weakness on L, resolved within 20 minutes  No deficits on exam today  Imaging thus far unremarkable      May have been a TIA, versus a stroke with no residual symptoms        -now on dual antiplatelet therapy, with plan to restart his eliquis if MRI does not show a hemorrhage  -continue on lipitor  -permissive HTN, with gradual lowering of BP  -for peripheral neuropathy, would check TSH, B12, B1, vitamin D, RPR, and SPEP  -neurology to follow results     Recommendations for outpatient neurological follow up have yet to be determined      ED Triage Vitals [04/26/21 1646]   Temperature Pulse Respirations Blood Pressure SpO2   97 9 °F (36 6 °C) 72 16 148/69 98 %      Temp Source Heart Rate Source Patient Position - Orthostatic VS BP Location FiO2 (%)   Oral Monitor Lying Left arm --      Wt Readings from Last 1 Encounters:   04/28/21 (!) 140 kg (309 lb 4 9 oz)     Additional Vital Signs:   04/27/21 1432  98 °F (36 7 °C)  70  18  113/68  85  96 %  None (Room air)  Sitting   04/27/21 1111  97 1 °F (36 2 °C)Abnormal   70  18  124/79  --  90 %  --  Sitting   04/27/21 0731  97 8 °F (36 6 °C)  57  20  135/74  --  99 %  --  Lying   04/27/21 0500  97 5 °F (36 4 °C)  70  18  134/80  --  98 %  None (Room air)  --   04/27/21 0300  98 °F (36 7 °C)  71  16  126/74  --  98 %  None (Room air)  --   04/27/21 0100  98 1 °F (36 7 °C)  70  18  113/66  --  96 %  None (Room air)  --   04/26/21 2300  97 6 °F (36 4 °C)  70  18  118/72  --  100 %  None (Room air)  --   04/26/21 2200  97 2 °F (36 2 °C)Abnormal   70  18  98/61  --  100 %  None (Room air)  --   04/26/21 2100  98 °F (36 7 °C)  71  18  117/59  --  100 %  None (Room air)  --   04/26/21 2000  98 °F (36 7 °C)  70  18  133/69  --  100 %  None (Room air)  -- 04/26/21 1900  97 4 °F (36 3 °C)Abnormal   70  18  144/81  --  100 %  None (Room air)       I/O 04/26 0701 04/27 0700 04/27 0701 04/28 0700   P  O   420   Total Intake(mL/kg)  420 (3)   Net  +420        Unmeasured Urine Occurrence 1 x 5 x     Pertinent Labs/Diagnostic Test Results:   Results from last 7 days   Lab Units 04/26/21  1652   SARS-COV-2  Negative     Results from last 7 days   Lab Units 04/28/21  0525 04/26/21  1631   WBC Thousand/uL 5 64 6 84   HEMOGLOBIN g/dL 13 2 14 3   HEMATOCRIT % 40 5 43 7   PLATELETS Thousands/uL 211 225   NEUTROS ABS Thousands/µL 3 88  --      Results from last 7 days   Lab Units 04/28/21  0525 04/26/21  1631   SODIUM mmol/L 142 142   POTASSIUM mmol/L 3 6 3 9   CHLORIDE mmol/L 105 106   CO2 mmol/L 28 27   ANION GAP mmol/L 9 9   BUN mg/dL 18 20   CREATININE mg/dL 0 91 1 11   EGFR ml/min/1 73sq m 82 65   CALCIUM mg/dL 9 3 9 9     Results from last 7 days   Lab Units 04/28/21  0729 04/27/21  2210 04/27/21  1628 04/26/21  1633   POC GLUCOSE mg/dl 128 107 116 138     Results from last 7 days   Lab Units 04/28/21  0525 04/26/21  1631   GLUCOSE RANDOM mg/dL 123 143*       Results from last 7 days   Lab Units 04/27/21  0545   HEMOGLOBIN A1C % 6 6*   EAG mg/dl 143     Results from last 7 days   Lab Units 04/26/21  1631   TROPONIN I ng/mL 0 07     Results from last 7 days   Lab Units 04/26/21  1631   PROTIME seconds 11 0   INR  0 97   PTT seconds 32*     Results from last 7 days   Lab Units 04/28/21  0525   TSH 3RD GENERATON uIU/mL 3 266     Results from last 7 days   Lab Units 04/28/21  0525   CRP mg/L <0 1   SED RATE mm/hour 14     12 LEAD EKG (Per MD):    AFib, rate of 70, irregular WA, normal QTC, no STEMI, left bundle-branch block     4/26 CTA NECK AND BRAIN WITH CONTRAST   No acute cardiopulmonary disease  4/28 CT BRAIN - WITHOUT CONTRAST   No evidence of hemodynamic significant stenosis, aneurysm or dissection       ED Treatment:   Medication Administration from 04/26/2021 1626 to 04/26/2021 1834       Date/Time Order Dose Route Action     04/26/2021 1644 iohexol (OMNIPAQUE) 350 MG/ML injection (SINGLE-DOSE) 100 mL 100 mL Intravenous Given     04/26/2021 1717 aspirin (ECOTRIN LOW STRENGTH) EC tablet 324 mg 324 mg Oral Given     04/26/2021 1717 clopidogrel (PLAVIX) tablet 75 mg 75 mg Oral Given     Past Medical History:   Diagnosis Date    Cancer ([facility] )     prostate    COPD (chronic obstructive pulmonary disease) ([facility] )     Essential hypertension     Kidney stone      Admitting Diagnosis: TIA (transient ischemic attack) [G45 9]  Stroke ([place] [address] ) [I63 9]    Age/Sex: 77 y o  male    Admission Orders:  TELEMETRY  VS + Neuro Checks Q1HR X 4 - Q4HRS  NIH STROKE SCALE Q24hrs  X 2  UP + OOB with assistance  MRI Brain  ECHO  OT Evals   SPEECH Eval Re Dysphagia Assessment  Diet Simon/CHO Controlled; Consistent Carbohydrate Diet Level 1 (4 carb servings/60 grams CHO/meal)     Scheduled Medications:  aspirin, 81 mg, Oral, Daily  atorvastatin, 40 mg, Oral, QPM  clopidogrel, 75 mg, Oral, Daily  ezetimibe, 10 mg, Oral, Daily  furosemide, 20 mg, Oral, Every Other Day  heparin (porcine), 5,000 Units, Subcutaneous, Q8H ENEIDA  insulin lispro, 1-5 Units, Subcutaneous, TID AC  insulin lispro, 1-5 Units, Subcutaneous, HS  metoprolol tartrate, 25 mg, Oral, Q12H [address]  multivitamin-minerals, 1 tablet, Oral, Daily    Continuous IV Infusions:     PRN Meds: none       IP CONSULT TO NEUROLOGY  IP CONSULT TO CASE MANAGEMENT  IP CONSULT TO NUTRITION SERVICES    Network Utilization Review Department  ATTENTION: Please call with any questions or concerns to [phone] and carefully listen to the prompts so that you are directed to the right person  All voicemails are confidential   Aidan Tomas all requests for admission clinical reviews, approved or denied determinations and any other requests to dedicated fax number below belonging to the campus where the patient is receiving treatment   List of dedicated fax numbers for the Facilities:  FACILITY NAME UR FAX NUMBER   ADMISSION DENIALS (Administrative/Medical Necessity) [phone]   [address] (Maternity/NICU/Pediatrics) 261 [facility] [address] [place] [address] [address] Kirby Cole [address] [id number] [address] [place] [address] [facility] [address] [phone]

## 2021-04-28 NOTE — ASSESSMENT & PLAN NOTE
-not an accurate historian, is not aware if he has heart failure, does follow with outpatient cardiology, reports he has had multiple echoes done in the past  -echo reports not available on chart review; inpatient echo done   -inpatient echo: Moderately reduced systolic function with  EF of 35-40%, severe diffuse hypokinesis with distinct regional wall motion abnormalities    Wall thickness moderately increased with moderate concentric hypertrophy  -patient presented with dyspnea for the last few weeks, associated with bilateral lower extremity pitting edema extending up to knees  -patient's home medication regimen should include Lasix 20 mg on Mondays Wednesdays and Fridays, however he is noncompliant  -denies any recent weight gains  -no prior echo on chart review      Continuing home medication; rosuvastatin 5 mg replaced with atorvastatin 40 mg OD

## 2021-04-28 NOTE — DISCHARGE INSTR - AVS FIRST PAGE
Discharge instructions from hospitalist  1  Follow-up with your primary care physician, Dr Guzman, in 1 week in regards to recent hospitalization and possible need for starting Diabetic medications  Please follow up with your cardiologist, Diane Doan, within the next 1 week  We also recommend you follow up with a neurologist  A prescription has been provided for an outpatient MRI  2  Take your medications regularly    New medications    Your rosuvastatin has been replaced by Atorvastatin 40 mg once a day  This is also a cholesterol medication  3  Come back to the ER if symptoms recur or worsen    4  Activity as tolerated    5   Diet :  Cardiac healthy

## 2021-04-28 NOTE — NUTRITION
04/28/21 1045   Recommendations/Interventions   Summary Discussed his need to 1) decrease wt, 2) reduce intake of sugar sweetened beverages 3) stop use of table salt  Provided pt with handout r/t cardiac diet and suggested he see a dietitian as OP at [facility]   He was agreeable  Pt admited to naun intakes of sugar sweetened ice tea and large quantities of salted nuts  Agreed to reduce intake  Provided some exercise strategies - arm chair exercises    He was agreeable to the plan   Malnutrition/BMI Present Yes   Adult BMI Classifications Morbid Obesity 40-44 9   Interventions Diet: order adjustment;MNT via Outpatient  (RD will adjust diet to CCD2 4 gm Na)

## 2021-04-29 LAB
1,25(OH)2D3 SERPL-MCNC: 54.2 PG/ML (ref 19.9–79.3)
ALBUMIN SERPL ELPH-MCNC: 4.1 G/DL (ref 3.5–5)
ALBUMIN SERPL ELPH-MCNC: 58.6 % (ref 52–65)
ALPHA1 GLOB SERPL ELPH-MCNC: 0.29 G/DL (ref 0.1–0.4)
ALPHA1 GLOB SERPL ELPH-MCNC: 4.1 % (ref 2.5–5)
ALPHA2 GLOB SERPL ELPH-MCNC: 0.81 G/DL (ref 0.4–1.2)
ALPHA2 GLOB SERPL ELPH-MCNC: 11.5 % (ref 7–13)
BETA GLOB ABNORMAL SERPL ELPH-MCNC: 0.4 G/DL (ref 0.4–0.8)
BETA1 GLOB SERPL ELPH-MCNC: 5.7 % (ref 5–13)
BETA2 GLOB SERPL ELPH-MCNC: 6.8 % (ref 2–8)
BETA2+GAMMA GLOB SERPL ELPH-MCNC: 0.48 G/DL (ref 0.2–0.5)
GAMMA GLOB ABNORMAL SERPL ELPH-MCNC: 0.93 G/DL (ref 0.5–1.6)
GAMMA GLOB SERPL ELPH-MCNC: 13.3 % (ref 12–22)
IGG/ALB SER: 1.42 {RATIO} (ref 1.1–1.8)
PROT PATTERN SERPL ELPH-IMP: NORMAL
PROT SERPL-MCNC: 7 G/DL (ref 6.4–8.2)
RPR SER QL: NORMAL

## 2021-05-04 ENCOUNTER — TRANSCRIBE ORDERS (OUTPATIENT)
Dept: SCHEDULING | Age: 76
End: 2021-05-04

## 2021-05-04 DIAGNOSIS — G45.9 TRANSIENT CEREBRAL ISCHEMIC ATTACK, UNSPECIFIED: Primary | ICD-10-CM

## 2021-05-05 LAB — VIT B1 BLD-SCNC: 166 NMOL/L (ref 66.5–200)

## 2021-05-27 ENCOUNTER — HOSPITAL ENCOUNTER (OUTPATIENT)
Dept: RADIOLOGY | Facility: HOSPITAL | Age: 76
Discharge: HOME | End: 2021-05-27
Attending: FAMILY MEDICINE
Payer: COMMERCIAL

## 2021-05-27 DIAGNOSIS — G45.9 TRANSIENT CEREBRAL ISCHEMIC ATTACK, UNSPECIFIED: ICD-10-CM

## (undated) DEVICE — PACK BASIC I

## (undated) DEVICE — ***USE 138152*** ENDOPOUCH RETRIEVE 10MM

## (undated) DEVICE — GLOVE SZ 7.5 PROTEXIS PI

## (undated) DEVICE — PAD GROUND ELECTROSURGICAL W/CORD

## (undated) DEVICE — HEMOSTAT SURGICEL ABSORBABLE 4 X 8

## (undated) DEVICE — HEMOSTAT SURGICEL SNOW ABSORB 4 X 4

## (undated) DEVICE — SOLN IRRIG .9%SOD 3L

## (undated) DEVICE — TROCAR XCEL BLADELESS 12MM X 100MM LONG

## (undated) DEVICE — DRAPE COLUMN DAVINCI XI

## (undated) DEVICE — GAUZE XEROFORM 1X8 STERILE/OVERWRAPPED PACKET

## (undated) DEVICE — TRAY WET SKIN PREP PREMIUM

## (undated) DEVICE — CLIPS HEMOLOK XL

## (undated) DEVICE — DRESSING TEGADERM 4X4 3/4

## (undated) DEVICE — BAG DECANTER

## (undated) DEVICE — DRAPE LARGE REVERSE FOLD

## (undated) DEVICE — STRYKEFLOW 2 W/ DISP TIP

## (undated) DEVICE — TUBING INSUFFLATION PNEUMOSURE HEATED HIGH FLOW

## (undated) DEVICE — APPLICATOR CHLORAPREP 26ML ORANGE TINT

## (undated) DEVICE — SOLUTION ELECTROLUBE ANTI-STICK

## (undated) DEVICE — SUTURE LAPRA TY ABSORB CLIP

## (undated) DEVICE — SUTURE VICRYL 2-0 J375H UR-5 VIOLET

## (undated) DEVICE — RELOAD 45 TISSUE TRI-STAPLER AMT PURPLE

## (undated) DEVICE — TAPE DURAPORE 3IN

## (undated) DEVICE — ***USE 119929 ***DRIVER NEEDLE LRG XI REPOSABLE

## (undated) DEVICE — SOLN IRRIG STER WATER 1000ML

## (undated) DEVICE — TROCAR BLADELESS OBTURATOR

## (undated) DEVICE — GLOVE SZ 7.5 LINER PROTEXIS PI BL

## (undated) DEVICE — DRAPE ARM DAVINCI XI

## (undated) DEVICE — EGIA 4 HANDLE XL

## (undated) DEVICE — SUTURE ETHILON 3-0   663G

## (undated) DEVICE — SYSTEM VISUALIZATION CLEARIFY

## (undated) DEVICE — ***USE 124736***SYRINGE TOOMEY

## (undated) DEVICE — DRAIN JP 15FR ROUND PVC

## (undated) DEVICE — PAD POSITIONING XL W/ARM PROTECTORS

## (undated) DEVICE — SUTURE VLOC 3-0 90 UNDYED 1X6 V-20

## (undated) DEVICE — Device

## (undated) DEVICE — COVER MAYO STAND

## (undated) DEVICE — METER URINE SILVER 350CC

## (undated) DEVICE — OBTURATOR BLADELESS 8MM XI

## (undated) DEVICE — SUTURE VLOC 3-0 90 VIOLET 1X6 V-20

## (undated) DEVICE — TRAY URINE METER FOLEY SILVER 16 FR 5CC 2 W

## (undated) DEVICE — SEAL UNIVERSAL 5MM-8MM XI

## (undated) DEVICE — SUTURE VICRYL 3-0 J416H SH UNDYED

## (undated) DEVICE — CLIP LIGATING LAPRO ABSORBABLE CARTRIDGE

## (undated) DEVICE — DRAPE POUCH IRRIGATION

## (undated) DEVICE — BLADE SCALPEL #10

## (undated) DEVICE — HEEL  ELBOW PROTECTOR

## (undated) DEVICE — RELOAD ENDO GIA45 ARTICULATING THICK MEDIUM

## (undated) DEVICE — MANIFOLD SINGLE PORT NEPTUNE

## (undated) DEVICE — SYRINGE DISP LUER-LOK 10 CC

## (undated) DEVICE — DRAPE IOBAN

## (undated) DEVICE — SOLN IRRIG .9%SOD 1000ML

## (undated) DEVICE — MANIFOLD FOUR PORT NEPTUNE

## (undated) DEVICE — ***USE 119932 ***FORCEPS BIPOLAR FENESTRATED XI

## (undated) DEVICE — DRESSING SPONGE 4X4 SOF-WICK

## (undated) DEVICE — ***USE 119945 ***SHEARS CRVD HOT XI  REPOSABLE

## (undated) DEVICE — TROCAR 1ST ENTRY 5 X 100MM ADV Z-THREAD

## (undated) DEVICE — ***USE 119937 ***FORCEPS PROGRASP XI REPOSABLE

## (undated) DEVICE — PENCIL ESU HANDSWITCHING W/HOL

## (undated) DEVICE — ADHESIVE SKIN DERMABOND ADVANCED 0.7ML

## (undated) DEVICE — APPLICATOR FLOSEAL ENDOSCOPIC

## (undated) DEVICE — LEGGINGS CLEAR PAIR

## (undated) DEVICE — TUBING STRYKEFLOW SUCT/IRRIG 10IN

## (undated) DEVICE — ***USE 57698*** SLEEVE FLOWTRON DVT CALF SINGLE USE

## (undated) DEVICE — OINTMENT SURGILUBE 4OZ TUBE

## (undated) DEVICE — EVACUATOR PLUME-AWAY LAP SMOKE PKG

## (undated) DEVICE — GOWN SURG LARGE MICROCOOL

## (undated) DEVICE — SUTURE MONOCRYL 4-0 Y426H PS-2 27IN

## (undated) DEVICE — SHEARS TIP COVER DAVINCI ONE USE

## (undated) DEVICE — TIPS SCISSOR MICROLINE LAP

## (undated) DEVICE — EVACUATOR CLOSED SUCTION 100C

## (undated) DEVICE — BLANKET UPPER BODY BAIR HUGGER

## (undated) DEVICE — NEEDLE INSUFFLATION 120MM